# Patient Record
Sex: FEMALE | Race: WHITE | NOT HISPANIC OR LATINO | Employment: FULL TIME | URBAN - METROPOLITAN AREA
[De-identification: names, ages, dates, MRNs, and addresses within clinical notes are randomized per-mention and may not be internally consistent; named-entity substitution may affect disease eponyms.]

---

## 2023-06-02 LAB
THYROTROPIN (MIU/L) IN SER/PLAS BY DETECTION LIMIT <= 0.05 MIU/L: 12.9 MIU/L (ref 0.44–3.98)
THYROXINE (T4) FREE (NG/DL) IN SER/PLAS: 0.92 NG/DL (ref 0.61–1.12)

## 2023-06-03 LAB
ESTIMATED AVERAGE GLUCOSE FOR HBA1C: 321 MG/DL
HEMOGLOBIN A1C/HEMOGLOBIN TOTAL IN BLOOD: 12.8 %

## 2023-06-07 ENCOUNTER — APPOINTMENT (OUTPATIENT)
Dept: PRIMARY CARE | Facility: CLINIC | Age: 53
End: 2023-06-07
Payer: COMMERCIAL

## 2023-06-09 ENCOUNTER — TELEPHONE (OUTPATIENT)
Dept: PRIMARY CARE | Facility: CLINIC | Age: 53
End: 2023-06-09

## 2023-06-09 ENCOUNTER — OFFICE VISIT (OUTPATIENT)
Dept: PRIMARY CARE | Facility: CLINIC | Age: 53
End: 2023-06-09
Payer: COMMERCIAL

## 2023-06-09 VITALS
BODY MASS INDEX: 28.49 KG/M2 | HEIGHT: 63 IN | DIASTOLIC BLOOD PRESSURE: 78 MMHG | RESPIRATION RATE: 17 BRPM | TEMPERATURE: 96 F | OXYGEN SATURATION: 96 % | HEART RATE: 89 BPM | SYSTOLIC BLOOD PRESSURE: 117 MMHG | WEIGHT: 160.8 LBS

## 2023-06-09 DIAGNOSIS — I10 BENIGN ESSENTIAL HYPERTENSION: ICD-10-CM

## 2023-06-09 DIAGNOSIS — Z12.31 BREAST CANCER SCREENING BY MAMMOGRAM: ICD-10-CM

## 2023-06-09 DIAGNOSIS — I25.10 CAD IN NATIVE ARTERY: ICD-10-CM

## 2023-06-09 DIAGNOSIS — Z98.61 S/P PTCA (PERCUTANEOUS TRANSLUMINAL CORONARY ANGIOPLASTY): ICD-10-CM

## 2023-06-09 DIAGNOSIS — E03.9 HYPOTHYROIDISM, UNSPECIFIED TYPE: ICD-10-CM

## 2023-06-09 DIAGNOSIS — E11.65 TYPE 2 DIABETES MELLITUS WITH HYPERGLYCEMIA, WITH LONG-TERM CURRENT USE OF INSULIN (MULTI): ICD-10-CM

## 2023-06-09 DIAGNOSIS — A60.09 GENITAL HERPES IN WOMEN: ICD-10-CM

## 2023-06-09 DIAGNOSIS — E11.59 TYPE 2 DIABETES MELLITUS WITH OTHER CIRCULATORY COMPLICATION, WITHOUT LONG-TERM CURRENT USE OF INSULIN (MULTI): Primary | ICD-10-CM

## 2023-06-09 DIAGNOSIS — Z79.4 TYPE 2 DIABETES MELLITUS WITH HYPERGLYCEMIA, WITH LONG-TERM CURRENT USE OF INSULIN (MULTI): ICD-10-CM

## 2023-06-09 DIAGNOSIS — K62.5 RECTAL BLEEDING: ICD-10-CM

## 2023-06-09 DIAGNOSIS — K59.09 CHRONIC CONSTIPATION: ICD-10-CM

## 2023-06-09 DIAGNOSIS — E78.2 MIXED HYPERLIPIDEMIA: ICD-10-CM

## 2023-06-09 DIAGNOSIS — Z11.59 ENCOUNTER FOR HEPATITIS C SCREENING TEST FOR LOW RISK PATIENT: ICD-10-CM

## 2023-06-09 PROBLEM — I25.110 CORONARY ARTERY DISEASE WITH UNSTABLE ANGINA PECTORIS (MULTI): Status: RESOLVED | Noted: 2023-06-09 | Resolved: 2023-06-09

## 2023-06-09 PROBLEM — K21.9 GERD (GASTROESOPHAGEAL REFLUX DISEASE): Status: ACTIVE | Noted: 2023-06-09

## 2023-06-09 PROBLEM — E78.5 HYPERLIPIDEMIA: Status: ACTIVE | Noted: 2023-06-09

## 2023-06-09 PROBLEM — K81.9 CHOLECYSTITIS: Status: RESOLVED | Noted: 2023-06-09 | Resolved: 2023-06-09

## 2023-06-09 PROBLEM — R19.8 ALTERNATING CONSTIPATION AND DIARRHEA: Status: ACTIVE | Noted: 2023-06-09

## 2023-06-09 PROBLEM — E55.9 VITAMIN D DEFICIENCY: Status: ACTIVE | Noted: 2023-06-09

## 2023-06-09 PROCEDURE — 3074F SYST BP LT 130 MM HG: CPT | Performed by: FAMILY MEDICINE

## 2023-06-09 PROCEDURE — 3078F DIAST BP <80 MM HG: CPT | Performed by: FAMILY MEDICINE

## 2023-06-09 PROCEDURE — 99204 OFFICE O/P NEW MOD 45 MIN: CPT | Performed by: FAMILY MEDICINE

## 2023-06-09 PROCEDURE — 1036F TOBACCO NON-USER: CPT | Performed by: FAMILY MEDICINE

## 2023-06-09 PROCEDURE — 3046F HEMOGLOBIN A1C LEVEL >9.0%: CPT | Performed by: FAMILY MEDICINE

## 2023-06-09 RX ORDER — NITROGLYCERIN 0.4 MG/1
TABLET SUBLINGUAL
COMMUNITY
Start: 2021-07-27

## 2023-06-09 RX ORDER — LEVOTHYROXINE SODIUM 200 UG/1
200 TABLET ORAL DAILY
Qty: 90 TABLET | Refills: 3 | Status: SHIPPED | OUTPATIENT
Start: 2023-06-09 | End: 2024-06-08

## 2023-06-09 RX ORDER — INSULIN LISPRO 100 [IU]/ML
INJECTION, SOLUTION INTRAVENOUS; SUBCUTANEOUS
COMMUNITY
End: 2023-06-09 | Stop reason: SDUPTHER

## 2023-06-09 RX ORDER — VALACYCLOVIR HYDROCHLORIDE 500 MG/1
1 TABLET, FILM COATED ORAL DAILY
COMMUNITY
End: 2023-07-07 | Stop reason: SDUPTHER

## 2023-06-09 RX ORDER — DOCUSATE SODIUM 100 MG/1
CAPSULE, LIQUID FILLED ORAL DAILY PRN
COMMUNITY

## 2023-06-09 RX ORDER — CHOLECALCIFEROL (VITAMIN D3) 125 MCG
CAPSULE ORAL
COMMUNITY
End: 2023-06-09 | Stop reason: DRUGHIGH

## 2023-06-09 RX ORDER — INSULIN GLARGINE 100 [IU]/ML
34 INJECTION, SOLUTION SUBCUTANEOUS NIGHTLY
Qty: 34 ML | Refills: 1 | Status: SHIPPED | OUTPATIENT
Start: 2023-06-09 | End: 2023-07-07 | Stop reason: SDUPTHER

## 2023-06-09 RX ORDER — CHOLECALCIFEROL (VITAMIN D3) 25 MCG
1 TABLET ORAL DAILY
COMMUNITY

## 2023-06-09 RX ORDER — PANTOPRAZOLE SODIUM 40 MG/1
TABLET, DELAYED RELEASE ORAL 2 TIMES DAILY
COMMUNITY
Start: 2021-08-20

## 2023-06-09 RX ORDER — INSULIN GLARGINE 100 [IU]/ML
INJECTION, SOLUTION SUBCUTANEOUS
COMMUNITY
Start: 2019-08-05 | End: 2023-06-09 | Stop reason: SDUPTHER

## 2023-06-09 RX ORDER — POLYETHYLENE GLYCOL 3350 17 G/17G
17 POWDER, FOR SOLUTION ORAL DAILY
Qty: 100 EACH | Refills: 3 | Status: SHIPPED | OUTPATIENT
Start: 2023-06-09 | End: 2023-07-07 | Stop reason: ALTCHOICE

## 2023-06-09 RX ORDER — ATORVASTATIN CALCIUM 80 MG/1
1 TABLET, FILM COATED ORAL NIGHTLY
COMMUNITY
Start: 2021-03-06 | End: 2023-12-05 | Stop reason: SDUPTHER

## 2023-06-09 RX ORDER — INSULIN LISPRO 100 [IU]/ML
25 INJECTION, SOLUTION INTRAVENOUS; SUBCUTANEOUS
Qty: 69 ML | Refills: 1 | Status: SHIPPED | OUTPATIENT
Start: 2023-06-09 | End: 2023-07-07 | Stop reason: ALTCHOICE

## 2023-06-09 RX ORDER — LEVOTHYROXINE SODIUM 50 UG/1
50 TABLET ORAL DAILY
Qty: 90 TABLET | Refills: 3 | Status: SHIPPED | OUTPATIENT
Start: 2023-06-09 | End: 2024-06-08

## 2023-06-09 RX ORDER — ASPIRIN 81 MG/1
1 TABLET ORAL DAILY
COMMUNITY
Start: 2021-03-06

## 2023-06-09 RX ORDER — LEVOTHYROXINE SODIUM 200 UG/1
1 TABLET ORAL DAILY
COMMUNITY
Start: 2021-03-08 | End: 2023-06-09 | Stop reason: DRUGHIGH

## 2023-06-09 RX ORDER — CLOPIDOGREL BISULFATE 75 MG/1
1 TABLET ORAL DAILY
COMMUNITY
Start: 2022-03-14 | End: 2023-12-28 | Stop reason: ALTCHOICE

## 2023-06-09 RX ORDER — ISOSORBIDE MONONITRATE 30 MG/1
1 TABLET, EXTENDED RELEASE ORAL DAILY
COMMUNITY
Start: 2021-08-06

## 2023-06-09 RX ORDER — LEVOTHYROXINE SODIUM 25 UG/1
1 TABLET ORAL DAILY
COMMUNITY
Start: 2021-03-08 | End: 2023-06-09 | Stop reason: DRUGHIGH

## 2023-06-09 RX ORDER — FIBER
1 TABLET ORAL DAILY
COMMUNITY

## 2023-06-09 ASSESSMENT — ENCOUNTER SYMPTOMS
APPETITE CHANGE: 0
FATIGUE: 0
BRUISES/BLEEDS EASILY: 0
DIZZINESS: 0
POLYPHAGIA: 0
COUGH: 0
LOSS OF SENSATION IN FEET: 0
SHORTNESS OF BREATH: 0
DIARRHEA: 0
HEADACHES: 0
CONSTIPATION: 0
NAUSEA: 0
MYALGIAS: 0
TROUBLE SWALLOWING: 0
POLYDIPSIA: 0
PALPITATIONS: 0
UNEXPECTED WEIGHT CHANGE: 0
OCCASIONAL FEELINGS OF UNSTEADINESS: 0
ARTHRALGIAS: 0
DEPRESSION: 0

## 2023-06-09 ASSESSMENT — VISUAL ACUITY
OD_CC: 20/20
OS_CC: 20/20

## 2023-06-09 ASSESSMENT — PATIENT HEALTH QUESTIONNAIRE - PHQ9
2. FEELING DOWN, DEPRESSED OR HOPELESS: NOT AT ALL
1. LITTLE INTEREST OR PLEASURE IN DOING THINGS: NOT AT ALL
SUM OF ALL RESPONSES TO PHQ9 QUESTIONS 1 AND 2: 0

## 2023-06-09 ASSESSMENT — COLUMBIA-SUICIDE SEVERITY RATING SCALE - C-SSRS
6. HAVE YOU EVER DONE ANYTHING, STARTED TO DO ANYTHING, OR PREPARED TO DO ANYTHING TO END YOUR LIFE?: NO
1. IN THE PAST MONTH, HAVE YOU WISHED YOU WERE DEAD OR WISHED YOU COULD GO TO SLEEP AND NOT WAKE UP?: NO
2. HAVE YOU ACTUALLY HAD ANY THOUGHTS OF KILLING YOURSELF?: NO

## 2023-06-09 NOTE — ASSESSMENT & PLAN NOTE
Did not tolerate Metformin. Januvia ineffective. On Insulin and never been in control. Has known CAD. Start with GLP1, taper insulin. Needs SGLT2 for cardioprotective effect but will add GLP1 alone first as do not want to cause hypoglycemia. Goal to get off insulin or minimize and get A1C down less than 7. Needs to check sugar ac and hs. Refer ophthal. Urine alb/creat. Referred [facility], nutrition. Has endo appt but not til December. Discussed need for frequent followup.

## 2023-06-09 NOTE — PATIENT INSTRUCTIONS
Started Rybelsus 3 mg, will go to 7 mg if doing ok next month.   Check sugars before meals and bedtime. Keep log of sugars, insulin intake.   Wean down Humalog if sugar goes down below 200 all the time.     You have referral to [facility].   You have referral to Nutrition.     Mammogram ordered.     Eat 3 meals a day and  snack in the evening. Goal carbs is 45-60 gm max with each meal and 1-2 15 gm carb snacks. No simplue sugars.   Get carb counting radha on phone.     Follow up in 4 weeks.

## 2023-06-09 NOTE — ASSESSMENT & PLAN NOTE
Sounds like hemorrhoid or fissure due to chronic constipation. Will order Miralax to take daily. Reviewed colonoscopy report.

## 2023-06-09 NOTE — PROGRESS NOTES
Subjective   Patient ID: Cleopatra Duncan is a 52 y.o. female who presents for [facility] (Establish new provider).    Here as new patient.     Type 2 Dm for 10 yrs. Has been on Metformin in the past. Did better on Januvia and insurance would not cover it. She has been on Lantus and Humalog and off for a while due to no doctor. She had been on Trulicity but only got one month. She had only been taking Humalog and has been out for a month. Lantus 36 units at night and Humalog sliding scale. She has not been checking sugar. She was doing 18-20 at every meal and not checking sugars. Always around 300. Not sure if had protein in urine. Vision is more blurry. Last eye visit was 1 yr. Dilated. Went to [place] prior. Needs new doctor for that. Seeing endocrinology in December. Does foot care, has intermittent tingling in feet.     Hypothyroid since 29 yrs old. Only missed 3 d of Levothyroxine. Has not been  increased in years. She is on 225 mcg daily. Takes on empty stomach.     Hyperlipidemia - On Atorvastatin every night.     MI 3/2021 with stent - sees Dr. Guillen. Stent in LAD.     GERD - on PPI recently restarted. EGD 5/22/2022. Chronic Gastritis.     Rectal bleeding - Dr. Guillen referred to GI. Has chronic constipation. Benefiber but was not taking enough. She had colonoscopy May 2022. Tubular adenoma times one, hyperplastic polyps, diverticulosis. Is on Gavilax. Blood is only when wipes. Only if strains. Due for repeat in May 2027. She has Bms about 3 times a week. Stool very hard at the beginning. Behind that, her stool is soft. Not using gavilax to full dose.     Menopausal - has itching down on perineum. Tube of anti-itch.     Genital herpes - comes back about 2 times a year. Last time in February.     Not seen gynecologist in years Hyster 2011 due to Fibroids and bleeding. Still with ovaries.                Current Outpatient Medications:     aspirin 81 mg EC tablet, Take 1 tablet (81 mg) by mouth once daily., Disp:  , Rfl:     atorvastatin (Lipitor) 80 mg tablet, Take 1 tablet (80 mg) by mouth once daily at bedtime., Disp: , Rfl:     cholecalciferol (Vitamin D-3) 25 MCG (1000 UT) tablet, Take 1 tablet (25 mcg) by mouth once daily., Disp: , Rfl:     clopidogrel (Plavix) 75 mg tablet, Take 1 tablet (75 mg) by mouth once daily., Disp: , Rfl:     docusate sodium (Colace) 100 mg capsule, Take by mouth once daily as needed., Disp: , Rfl:     hydrocortisone acetate 2.5 % cream with perineal applicator, once daily., Disp: , Rfl:     isosorbide mononitrate ER (Imdur) 30 mg 24 hr tablet, Take 1 tablet (30 mg) by mouth once daily., Disp: , Rfl:     multivitamin-min-iron-FA-vit K (Multi-Day Plus Minerals) 18 mg iron-400 mcg-25 mcg tablet, Take 1 tablet by mouth once daily., Disp: , Rfl:     nitroglycerin (Nitrostat) 0.4 mg SL tablet, Place under the tongue., Disp: , Rfl:     pantoprazole (ProtoNix) 40 mg EC tablet, Take by mouth twice a day., Disp: , Rfl:     valACYclovir (Valtrex) 500 mg tablet, Take 1 tablet (500 mg) by mouth once daily., Disp: , Rfl:     insulin lispro (HumaLOG U-100 Insulin) 100 unit/mL injection, Inject 0.25 mL (25 Units) under the skin 3 times a day with meals. Take as directed per insulin instructions., Disp: 69 mL, Rfl: 1    Lantus Solostar U-100 Insulin 100 unit/mL (3 mL) pen, Inject 34 Units under the skin once daily at bedtime., Disp: 34 mL, Rfl: 1    levothyroxine (Synthroid, Levoxyl) 200 mcg tablet, Take 1 tablet (200 mcg) by mouth once daily., Disp: 90 tablet, Rfl: 3    levothyroxine (Synthroid, Levoxyl) 50 mcg tablet, Take 1 tablet (50 mcg) by mouth once daily., Disp: 90 tablet, Rfl: 3    polyethylene glycol (Glycolax) 17 gram packet, Take 17 g by mouth once daily. Mix 1 cap (17g) into 8 ounces of fluid., Disp: 100 each, Rfl: 3    semaglutide (Rybelsus) 3 mg tablet tablet, Take 1 tablet (3 mg) by mouth once daily., Disp: 30 tablet, Rfl: 1    Patient Active Problem List   Diagnosis    Alternating constipation  "and diarrhea    Benign essential hypertension    CAD in native artery    Genital herpes in women    GERD (gastroesophageal reflux disease)    Hyperlipidemia    Hypothyroidism    Overweight    Rectal bleeding    S/P PTCA (percutaneous transluminal coronary angioplasty)    Vitamin D deficiency    Type 2 diabetes mellitus with hyperglycemia, with long-term current use of insulin (CMS/[facility])    Chronic constipation         Review of Systems   Constitutional:  Negative for appetite change, fatigue and unexpected weight change.   HENT:  Negative for trouble swallowing.    Eyes:  Negative for visual disturbance.   Respiratory:  Negative for cough and shortness of breath.    Cardiovascular:  Negative for chest pain, palpitations and leg swelling.   Gastrointestinal:  Negative for constipation, diarrhea and nausea.   Endocrine: Negative for cold intolerance, heat intolerance, polydipsia, polyphagia and polyuria.   Musculoskeletal:  Negative for arthralgias and myalgias.   Skin:  Negative for rash.   Neurological:  Negative for dizziness and headaches.   Hematological:  Does not bruise/bleed easily.       Objective   /78 (BP Location: Left arm, Patient Position: Sitting, BP Cuff Size: Adult)   Pulse 89   Temp 35.6 °C (96 °F)   Resp 17   Ht 1.6 m (5' 3\")   Wt 72.9 kg (160 lb 12.8 oz)   SpO2 96%   BMI 28.48 kg/m²     Physical Exam  Vitals reviewed.   Constitutional:       General: She is not in acute distress.     Appearance: She is not ill-appearing.   HENT:      Head: Normocephalic.   Neck:      Vascular: No carotid bruit.      Comments: No thyromegaly  Cardiovascular:      Rate and Rhythm: Normal rate and regular rhythm.      Pulses: Normal pulses.      Heart sounds: No murmur heard.  Pulmonary:      Effort: Pulmonary effort is normal.      Breath sounds: Normal breath sounds.   Musculoskeletal:      Right lower leg: No edema.      Left lower leg: No edema.   Skin:     Findings: No rash.   Neurological:      " Mental Status: She is alert.   Psychiatric:         Mood and Affect: Mood normal.         Assessment/Plan   Problem List Items Addressed This Visit          Circulatory    Benign essential hypertension     BP at goal. Discussed role of BP control in DM in reduction of renovascular and cardiovascular risk. Labs, follow up 4 weeks. Referred to [facility] clinic.          Relevant Orders    Referral to [facility]    Referral to Nutrition Services    CAD in native artery     Has stent in place. Discussed mportance of glucose control, advantages of diabetes medication choice . Discusse dimportance of diabetes and exercise. She needs local cardiology follow up. She is interested in the [facility] clinin. Referred. Continued current medication pending labs and further evaluation.          Relevant Medications    clopidogrel (Plavix) 75 mg tablet    isosorbide mononitrate ER (Imdur) 30 mg 24 hr tablet    nitroglycerin (Nitrostat) 0.4 mg SL tablet    semaglutide (Rybelsus) 3 mg tablet tablet    Other Relevant Orders    Referral to [facility]       Digestive    Rectal bleeding     Sounds like hemorrhoid or fissure due to chronic constipation. Will order Miralax to take daily. Reviewed colonoscopy report.          Chronic constipation     She has not been using Miralax at proper dose. Using child's Miralax. Given rx for 17 gm daily. Discussed need to tirtrate to 1 stool daily. Encuraged to avoid oral laxative pills. Stay well hydrated, increase actitivy.          Relevant Medications    polyethylene glycol (Glycolax) 17 gram packet       Genitourinary    Genital herpes in women     Recurs if does not take Valacyclovir prophylaxis. Continue daily medication            Endocrine/Metabolic    Hypothyroidism     TSH 12.90 on 225 MCG. Increase to 200 and 50 mg pills. Recheck labs in 6-8 weeks.          Relevant Medications    levothyroxine (Synthroid, Levoxyl) 50 mcg tablet    levothyroxine (Synthroid, Levoxyl) 200 mcg tablet    Type  2 diabetes mellitus with hyperglycemia, with long-term current use of insulin (CMS/[facility]) - Primary     Did not tolerate Metformin. Januvia ineffective. On Insulin and never been in control. Has known CAD. Start with GLP1, taper insulin. Needs SGLT2 for cardioprotective effect but will add GLP1 alone first as do not want to cause hypoglycemia. Goal to get off insulin or minimize and get A1C down less than 7. Needs to check sugar ac and hs. Refer ophthal. Urine alb/creat. Referred [facility] nutrition. Has endo appt but not til December. Discussed need for frequent followup.          Relevant Medications    semaglutide (Rybelsus) 3 mg tablet tablet    Lantus Solostar U-100 Insulin 100 unit/mL (3 mL) pen    insulin lispro (HumaLOG U-100 Insulin) 100 unit/mL injection       Other    Hyperlipidemia     Statin was ordered by cardiology and labs done this week at goal. Continue statin. Would like to start her on ACEI or ARB or CCB but need to check with cardiology first. Currently, cardiology in [place] and will need local cardiology.          Relevant Orders    Referral to [facility]    Referral to Nutrition Services    S/P PTCA (percutaneous transluminal coronary angioplasty)     On Clopidogrel, statin, isosorbide. Has seen cardioliogy recently but will need to establish locally.           Other Visit Diagnoses       Breast cancer screening by mammogram        Relevant Orders    BI mammo bilateral screening tomosynthesis    Encounter for hepatitis C screening test for low risk patient        Relevant Orders    Hepatitis C Antibody              Assessment, plans, tests, and follow up discussed with patient and patient verbalized understanding. Cleopatra was given an opportunity to ask questions and  any concerns were addressed including but not limited to medications, goals of therapy, medi changes, referrals, and follow up..

## 2023-06-09 NOTE — TELEPHONE ENCOUNTER
Rx Refill Request Telephone Encounter  These were not sent over and patient is completely out.  Name:  Cleopatra Duncan  :  [id number]  Medication Name:    insulin lispro (HumaLOG) 100 unit/mL injection      Lantus Solostar U-100 Insulin 100 unit/mL (3 mL) pen         Specific Pharmacy location: [facility] - [place] [address]    Date of last appointment: 23   Date of next appointment:  23  Best number to reach patient: [phone]

## 2023-06-09 NOTE — ASSESSMENT & PLAN NOTE
Statin was ordered by cardiology and labs done this week at goal. Continue statin. Would like to start her on ACEI or ARB or CCB but need to check with cardiology first. Currently, cardiology in [place] and will need local cardiology.

## 2023-06-10 PROBLEM — K59.09 CHRONIC CONSTIPATION: Status: ACTIVE | Noted: 2023-06-10

## 2023-06-11 NOTE — ASSESSMENT & PLAN NOTE
Has stent in place. Discussed mportance of glucose control, advantages of diabetes medication choice . Discusse dimportance of diabetes and exercise. She needs local cardiology follow up. She is interested in the [facility] clinin. Referred. Continued current medication pending labs and further evaluation.

## 2023-06-11 NOTE — ASSESSMENT & PLAN NOTE
On Clopidogrel, statin, isosorbide. Has seen cardioliogy recently but will need to establish locally.

## 2023-06-11 NOTE — ASSESSMENT & PLAN NOTE
She has not been using Miralax at proper dose. Using child's Miralax. Given rx for 17 gm daily. Discussed need to tirtrate to 1 stool daily. Encuraged to avoid oral laxative pills. Stay well hydrated, increase actitivy.

## 2023-06-11 NOTE — ASSESSMENT & PLAN NOTE
BP at goal. Discussed role of BP control in DM in reduction of renovascular and cardiovascular risk. Labs, follow up 4 weeks. Referred to [facility].

## 2023-06-16 ENCOUNTER — LAB (OUTPATIENT)
Dept: LAB | Facility: LAB | Age: 53
End: 2023-06-16
Payer: COMMERCIAL

## 2023-06-16 DIAGNOSIS — Z11.59 ENCOUNTER FOR HEPATITIS C SCREENING TEST FOR LOW RISK PATIENT: ICD-10-CM

## 2023-06-16 DIAGNOSIS — E11.59 TYPE 2 DIABETES MELLITUS WITH OTHER CIRCULATORY COMPLICATION, WITHOUT LONG-TERM CURRENT USE OF INSULIN (MULTI): ICD-10-CM

## 2023-06-16 LAB
ALBUMIN (MG/L) IN URINE: 10.9 MG/L
ALBUMIN/CREATININE (UG/MG) IN URINE: 7.8 UG/MG CRT (ref 0–30)
CREATININE (MG/DL) IN URINE: 139 MG/DL (ref 20–320)
HEPATITIS C VIRUS AB PRESENCE IN SERUM: NONREACTIVE

## 2023-06-16 PROCEDURE — 82043 UR ALBUMIN QUANTITATIVE: CPT

## 2023-06-16 PROCEDURE — 82570 ASSAY OF URINE CREATININE: CPT

## 2023-06-16 PROCEDURE — 36415 COLL VENOUS BLD VENIPUNCTURE: CPT

## 2023-06-16 PROCEDURE — 86803 HEPATITIS C AB TEST: CPT

## 2023-07-07 ENCOUNTER — OFFICE VISIT (OUTPATIENT)
Dept: PRIMARY CARE | Facility: CLINIC | Age: 53
End: 2023-07-07
Payer: COMMERCIAL

## 2023-07-07 VITALS
WEIGHT: 161.2 LBS | SYSTOLIC BLOOD PRESSURE: 106 MMHG | DIASTOLIC BLOOD PRESSURE: 74 MMHG | OXYGEN SATURATION: 97 % | HEART RATE: 84 BPM | TEMPERATURE: 97 F | HEIGHT: 63 IN | BODY MASS INDEX: 28.56 KG/M2

## 2023-07-07 DIAGNOSIS — Z79.4 TYPE 2 DIABETES MELLITUS WITH HYPERGLYCEMIA, WITH LONG-TERM CURRENT USE OF INSULIN (MULTI): Primary | ICD-10-CM

## 2023-07-07 DIAGNOSIS — E03.9 HYPOTHYROIDISM, UNSPECIFIED TYPE: ICD-10-CM

## 2023-07-07 DIAGNOSIS — E11.59 TYPE 2 DIABETES MELLITUS WITH OTHER CIRCULATORY COMPLICATION, WITHOUT LONG-TERM CURRENT USE OF INSULIN (MULTI): ICD-10-CM

## 2023-07-07 DIAGNOSIS — K62.5 RECTAL BLEEDING: ICD-10-CM

## 2023-07-07 DIAGNOSIS — E55.9 VITAMIN D DEFICIENCY: ICD-10-CM

## 2023-07-07 DIAGNOSIS — E78.2 MIXED HYPERLIPIDEMIA: ICD-10-CM

## 2023-07-07 DIAGNOSIS — A60.09 GENITAL HERPES IN WOMEN: ICD-10-CM

## 2023-07-07 DIAGNOSIS — Z23 NEED FOR VACCINATION: ICD-10-CM

## 2023-07-07 DIAGNOSIS — E11.65 TYPE 2 DIABETES MELLITUS WITH HYPERGLYCEMIA, WITH LONG-TERM CURRENT USE OF INSULIN (MULTI): Primary | ICD-10-CM

## 2023-07-07 PROBLEM — N60.09 CYST OF BREAST: Status: RESOLVED | Noted: 2023-07-07 | Resolved: 2023-07-07

## 2023-07-07 PROBLEM — R42 VERTIGO: Status: RESOLVED | Noted: 2023-07-07 | Resolved: 2023-07-07

## 2023-07-07 PROCEDURE — 3046F HEMOGLOBIN A1C LEVEL >9.0%: CPT | Performed by: FAMILY MEDICINE

## 2023-07-07 PROCEDURE — 90471 IMMUNIZATION ADMIN: CPT | Performed by: FAMILY MEDICINE

## 2023-07-07 PROCEDURE — 3074F SYST BP LT 130 MM HG: CPT | Performed by: FAMILY MEDICINE

## 2023-07-07 PROCEDURE — 99214 OFFICE O/P EST MOD 30 MIN: CPT | Performed by: FAMILY MEDICINE

## 2023-07-07 PROCEDURE — 1036F TOBACCO NON-USER: CPT | Performed by: FAMILY MEDICINE

## 2023-07-07 PROCEDURE — 3078F DIAST BP <80 MM HG: CPT | Performed by: FAMILY MEDICINE

## 2023-07-07 PROCEDURE — 90715 TDAP VACCINE 7 YRS/> IM: CPT | Performed by: FAMILY MEDICINE

## 2023-07-07 RX ORDER — VALACYCLOVIR HYDROCHLORIDE 500 MG/1
500 TABLET, FILM COATED ORAL DAILY
Qty: 30 TABLET | Refills: 2 | Status: SHIPPED | OUTPATIENT
Start: 2023-07-07 | End: 2023-10-05

## 2023-07-07 RX ORDER — INSULIN LISPRO 100 [IU]/ML
20 INJECTION, SOLUTION INTRAVENOUS; SUBCUTANEOUS
COMMUNITY
Start: 2023-06-09 | End: 2023-07-07 | Stop reason: SDUPTHER

## 2023-07-07 RX ORDER — INSULIN LISPRO 100 [IU]/ML
20 INJECTION, SOLUTION INTRAVENOUS; SUBCUTANEOUS
Qty: 60 ML | Refills: 1 | Status: SHIPPED | OUTPATIENT
Start: 2023-07-07 | End: 2023-09-08 | Stop reason: SDUPTHER

## 2023-07-07 RX ORDER — INSULIN GLARGINE 100 [IU]/ML
40 INJECTION, SOLUTION SUBCUTANEOUS NIGHTLY
Qty: 36 ML | Refills: 1 | Status: SHIPPED | OUTPATIENT
Start: 2023-07-07 | End: 2023-12-29 | Stop reason: ALTCHOICE

## 2023-07-07 ASSESSMENT — ENCOUNTER SYMPTOMS
POLYDIPSIA: 0
HEADACHES: 0
PALPITATIONS: 0
POLYPHAGIA: 0
FATIGUE: 0
DIZZINESS: 0
APPETITE CHANGE: 0
TROUBLE SWALLOWING: 0
UNEXPECTED WEIGHT CHANGE: 0
OCCASIONAL FEELINGS OF UNSTEADINESS: 0
SHORTNESS OF BREATH: 0
MYALGIAS: 0
NAUSEA: 0
DIARRHEA: 0
LOSS OF SENSATION IN FEET: 0
DEPRESSION: 0

## 2023-07-07 NOTE — PROGRESS NOTES
Subjective   Patient ID: Cleopatra Duncan is a 52 y.o. female who presents for Follow-up (4 week follow up medication.  She has not started it yet due to insurance not approving.  It is still pending status. ).    Here to follow up from New patient visit.     She was referred to [facility] , visit scheduled next month.     DM2 - she is cutting out sweets, or substitute sugar free candy. Limits breads, got thin bread. Switched pop to sugar free. Only every other day. She did not get Rybelsus. Was told prior authorization was holding it up. She is going to see endocrinology at [facility].   Brought sugar logs. Fastings 240-203. Lunch 168-217, Dinner 177- 321. HS usu 290 - 305, once 198. She does not take short acting insulin for high sugars at bedtime or if skips a meal. Currently takes 9-15 units with meal even if sugars high.     Hypothyroid - increased dose of Synthroid to 250 mcg last time. Due for repeat lab in 1-2 months. She does not feel any different.     Heart disease - on Imdur, Clpidogrel, Atorvastatin.  Sees Dr. Guillen in [place]. Labs were [place] [facility]. Had PTCA with angioplasty.     Needs refill for valtrex.     Constipation is better.     Perineal itching went away with yeast cream.     Leaks urine when coughs. She goes to bathroom frequently to stay empty. She also gets urgency if does not go frequently enough.                Current Outpatient Medications:     aspirin 81 mg EC tablet, Take 1 tablet (81 mg) by mouth once daily., Disp: , Rfl:     atorvastatin (Lipitor) 80 mg tablet, Take 1 tablet (80 mg) by mouth once daily at bedtime., Disp: , Rfl:     cholecalciferol (Vitamin D-3) 25 MCG (1000 UT) tablet, Take 1 tablet (25 mcg) by mouth once daily., Disp: , Rfl:     clopidogrel (Plavix) 75 mg tablet, Take 1 tablet (75 mg) by mouth once daily., Disp: , Rfl:     docusate sodium (Colace) 100 mg capsule, Take by mouth once daily as needed., Disp: , Rfl:     hydrocortisone acetate 2.5 % cream with perineal applicator, once  daily., Disp: , Rfl:     isosorbide mononitrate ER (Imdur) 30 mg 24 hr tablet, Take 1 tablet (30 mg) by mouth once daily., Disp: , Rfl:     levothyroxine (Synthroid, Levoxyl) 200 mcg tablet, Take 1 tablet (200 mcg) by mouth once daily., Disp: 90 tablet, Rfl: 3    levothyroxine (Synthroid, Levoxyl) 50 mcg tablet, Take 1 tablet (50 mcg) by mouth once daily., Disp: 90 tablet, Rfl: 3    multivitamin-min-iron-FA-vit K (Multi-Day Plus Minerals) 18 mg iron-400 mcg-25 mcg tablet, Take 1 tablet by mouth once daily., Disp: , Rfl:     nitroglycerin (Nitrostat) 0.4 mg SL tablet, Place under the tongue., Disp: , Rfl:     pantoprazole (ProtoNix) 40 mg EC tablet, Take by mouth twice a day., Disp: , Rfl:     HumaLOG KwikPen Insulin 100 unit/mL injection, Inject 20 Units under the skin 3 times a day with meals. Adjust per sliding scale, No more than 100 units daily, Disp: 60 mL, Rfl: 1    Lantus Solostar U-100 Insulin 100 unit/mL (3 mL) pen, Inject 40 Units under the skin once daily at bedtime., Disp: 36 mL, Rfl: 1    semaglutide (Rybelsus) 3 mg tablet tablet, Take 1 tablet (3 mg) by mouth once daily. (Patient not taking: Reported on 7/7/2023), Disp: 30 tablet, Rfl: 1    valACYclovir (Valtrex) 500 mg tablet, Take 1 tablet (500 mg) by mouth once daily., Disp: 30 tablet, Rfl: 2    Patient Active Problem List   Diagnosis    Alternating constipation and diarrhea    CAD in native artery    Genital herpes in women    GERD (gastroesophageal reflux disease)    Hyperlipidemia    Hypothyroidism    Overweight    Rectal bleeding    S/P PTCA (percutaneous transluminal coronary angioplasty)    Vitamin D deficiency    Type 2 diabetes mellitus with hyperglycemia, with long-term current use of insulin (CMS/[facility])    Chronic constipation         Review of Systems   Constitutional:  Negative for appetite change, fatigue and unexpected weight change.   HENT:  Negative for trouble swallowing.    Respiratory:  Negative for shortness of breath.   "  Cardiovascular:  Negative for chest pain, palpitations and leg swelling.   Gastrointestinal:  Negative for diarrhea and nausea.   Endocrine: Positive for polyuria. Negative for cold intolerance, heat intolerance, polydipsia and polyphagia.   Genitourinary:  Positive for urgency.   Musculoskeletal:  Negative for myalgias.   Skin:  Negative for rash.   Neurological:  Negative for dizziness and headaches.       Objective   /74 (BP Location: Left arm, Patient Position: Sitting)   Pulse 84   Temp 36.1 °C (97 °F)   Ht 1.6 m (5' 3\")   Wt 73.1 kg (161 lb 3.2 oz)   SpO2 97%   BMI 28.56 kg/m²     Physical Exam  Vitals reviewed.   Constitutional:       Appearance: Normal appearance.   Pulmonary:      Effort: Pulmonary effort is normal.   Neurological:      Mental Status: She is alert.   Psychiatric:         Mood and Affect: Mood normal.         Behavior: Behavior normal.         Recent Results (from the past 672 hour(s))   Hepatitis C Antibody    Collection Time: 06/16/23  4:04 PM   Result Value Ref Range    Hepatitis C Ab NONREACTIVE NONREACTIVE   Albumin , Urine Random    Collection Time: 06/16/23  4:04 PM   Result Value Ref Range    ALBUMIN (MG/L) IN URINE 10.9 Not Established mg/L    Albumin/Creatine Ratio 7.8 0.0 - 30.0 ug/mg crt    Creatinine, Urine 139.0 20.0 - 320.0 mg/dL         Assessment/Plan   Problem List Items Addressed This Visit       Genital herpes in women     Valtrex refilled for recurrence.          Relevant Medications    valACYclovir (Valtrex) 500 mg tablet    Hyperlipidemia     On statin, repeat lab in September.          Relevant Orders    Comprehensive Metabolic Panel    Lipid Panel    Hypothyroidism     Dose was increased couple weeks ago to 250 mcg daily. Repeat labs in September.          Relevant Orders    Thyroxine, Free    Thyroid Stimulating Hormone    Rectal bleeding     Resolved with constipation resolving.          Vitamin D deficiency    Type 2 diabetes mellitus with " hyperglycemia, with long-term current use of insulin (CMS/[facility]) - Primary     Increase Lantus to 40 units.     Sliding scale adjusted.  Continue each meal 6 units with the sliding scale.        Use sliding scale even when not eating.          150-200  add 6 units       201-250  add 8 untis       251-300  add 10 units       301-350  add 12 units.        351-400  add 14 units.    Need to check on Rybelsus prior auth.          Relevant Medications    Lantus Solostar U-100 Insulin 100 unit/mL (3 mL) pen    HumaLOG KwikPen Insulin 100 unit/mL injection    Other Relevant Orders    Comprehensive Metabolic Panel    Hemoglobin A1C    Lipid Panel     Other Visit Diagnoses       Type 2 diabetes mellitus with other circulatory complication, without long-term current use of insulin (CMS/[facility])        Relevant Medications    Lantus Solostar U-100 Insulin 100 unit/mL (3 mL) pen    Need for vaccination        Relevant Orders    Tdap vaccine, age 10 years and older (BOOSTRIX) (Completed)              Assessment, plans, tests, and follow up discussed with patient and patient verbalized understanding. Cleopatra was given an opportunity to ask questions and  any concerns were addressed including but not limited to medications, goals of therpay, lab results and follow up.

## 2023-07-07 NOTE — ASSESSMENT & PLAN NOTE
Increase Lantus to 40 units.     Sliding scale adjusted.  Continue each meal 6 units with the sliding scale.        Use sliding scale even when not eating.          150-200  add 6 units       201-250  add 8 untis       251-300  add 10 units       301-350  add 12 units.        351-400  add 14 units.    Need to check on Rybelsus prior auth.

## 2023-07-07 NOTE — PATIENT INSTRUCTIONS
Increase Lantus to 40 units.     Sliding scale adjusted.  Continue each meal 6 units with the sliding scale.        Use sliding scale even when not eating.          150-200  add 6 units       201-250  add 8 untis       251-300  add 10 units       301-350  add 12 units.        351-400  add 14 units.     Keep appontment at [facility].     I will check on the [facility] prior authorization.     Follow up in 3 months  at [facility] with labs prior to visit. Will discuss who is doing what after you are seen at [facility] clinic.     Continue higher dose of Thyroid.

## 2023-08-12 LAB
ALANINE AMINOTRANSFERASE (SGPT) (U/L) IN SER/PLAS: 21 U/L (ref 7–45)
ALBUMIN (G/DL) IN SER/PLAS: 4.1 G/DL (ref 3.4–5)
ALKALINE PHOSPHATASE (U/L) IN SER/PLAS: 146 U/L (ref 33–110)
ANION GAP IN SER/PLAS: 14 MMOL/L (ref 10–20)
ASPARTATE AMINOTRANSFERASE (SGOT) (U/L) IN SER/PLAS: 16 U/L (ref 9–39)
BILIRUBIN TOTAL (MG/DL) IN SER/PLAS: 0.5 MG/DL (ref 0–1.2)
CALCIUM (MG/DL) IN SER/PLAS: 9.4 MG/DL (ref 8.6–10.3)
CARBON DIOXIDE, TOTAL (MMOL/L) IN SER/PLAS: 26 MMOL/L (ref 21–32)
CHLORIDE (MMOL/L) IN SER/PLAS: 103 MMOL/L (ref 98–107)
CHOLESTEROL (MG/DL) IN SER/PLAS: 136 MG/DL (ref 0–199)
CHOLESTEROL IN HDL (MG/DL) IN SER/PLAS: 37.7 MG/DL
CHOLESTEROL/HDL RATIO: 3.6
CREATININE (MG/DL) IN SER/PLAS: 0.69 MG/DL (ref 0.5–1.05)
GFR FEMALE: >90 ML/MIN/1.73M2
GLUCOSE (MG/DL) IN SER/PLAS: 267 MG/DL (ref 74–99)
LDL: 68 MG/DL (ref 0–99)
POTASSIUM (MMOL/L) IN SER/PLAS: 4.8 MMOL/L (ref 3.5–5.3)
PROTEIN TOTAL: 7.1 G/DL (ref 6.4–8.2)
SODIUM (MMOL/L) IN SER/PLAS: 138 MMOL/L (ref 136–145)
TRIGLYCERIDE (MG/DL) IN SER/PLAS: 153 MG/DL (ref 0–149)
UREA NITROGEN (MG/DL) IN SER/PLAS: 19 MG/DL (ref 6–23)
VLDL: 31 MG/DL (ref 0–40)

## 2023-08-26 LAB
C PEPTIDE (NG/ML) IN SER/PLAS: 3.1 NG/ML (ref 0.7–3.9)
C REACTIVE PROTEIN (MG/L) IN SER/PLAS BY HIGH SENSIT: 6.6 MG/L

## 2023-08-30 LAB — LIPOPROTEIN A: 7 MG/DL

## 2023-09-08 DIAGNOSIS — Z79.4 TYPE 2 DIABETES MELLITUS WITH HYPERGLYCEMIA, WITH LONG-TERM CURRENT USE OF INSULIN (MULTI): ICD-10-CM

## 2023-09-08 DIAGNOSIS — E11.65 TYPE 2 DIABETES MELLITUS WITH HYPERGLYCEMIA, WITH LONG-TERM CURRENT USE OF INSULIN (MULTI): ICD-10-CM

## 2023-09-08 NOTE — TELEPHONE ENCOUNTER
Rx Refill Request Telephone Encounter    Name:  Cleopatra Duncan  :  [id number]  Medication Name:      HumaLOG KwikPen Insulin 100 unit/mL injection [[id number]]  inject 20 units under skin 3 times a day with meals.      Patient stated that the pharmacy said this prescription was cancelled on our end.     Patient is totally put.                      Specific Pharmacy location:  [facility]    Date of last appointment:  2023  Date of next appointment:  2023  Best number to reach patient:  [phone]

## 2023-09-22 ENCOUNTER — APPOINTMENT (OUTPATIENT)
Dept: PRIMARY CARE | Facility: CLINIC | Age: 53
End: 2023-09-22
Payer: COMMERCIAL

## 2023-09-27 RX ORDER — INSULIN LISPRO 100 [IU]/ML
20 INJECTION, SOLUTION INTRAVENOUS; SUBCUTANEOUS
Qty: 60 ML | Refills: 1 | Status: SHIPPED | OUTPATIENT
Start: 2023-09-27 | End: 2023-09-27

## 2023-10-01 DIAGNOSIS — E11.65 TYPE 2 DIABETES MELLITUS WITH HYPERGLYCEMIA, WITH LONG-TERM CURRENT USE OF INSULIN (MULTI): Primary | ICD-10-CM

## 2023-10-01 DIAGNOSIS — Z79.4 TYPE 2 DIABETES MELLITUS WITH HYPERGLYCEMIA, WITH LONG-TERM CURRENT USE OF INSULIN (MULTI): Primary | ICD-10-CM

## 2023-10-04 ENCOUNTER — PHARMACY VISIT (OUTPATIENT)
Dept: PHARMACY | Facility: CLINIC | Age: 53
End: 2023-10-04
Payer: COMMERCIAL

## 2023-10-04 RX ORDER — INSULIN LISPRO 100 [IU]/ML
INJECTION, SOLUTION INTRAVENOUS; SUBCUTANEOUS
Qty: 420 ML | Refills: 0 | Status: SHIPPED | OUTPATIENT
Start: 2023-10-04 | End: 2023-12-28 | Stop reason: SDUPTHER

## 2023-10-14 ENCOUNTER — PHARMACY VISIT (OUTPATIENT)
Dept: PHARMACY | Facility: CLINIC | Age: 53
End: 2023-10-14
Payer: COMMERCIAL

## 2023-10-14 PROCEDURE — RXMED WILLOW AMBULATORY MEDICATION CHARGE

## 2023-10-16 ENCOUNTER — PHARMACY VISIT (OUTPATIENT)
Dept: PHARMACY | Facility: CLINIC | Age: 53
End: 2023-10-16
Payer: COMMERCIAL

## 2023-10-16 PROCEDURE — RXMED WILLOW AMBULATORY MEDICATION CHARGE

## 2023-10-25 ENCOUNTER — PHARMACY VISIT (OUTPATIENT)
Dept: PHARMACY | Facility: CLINIC | Age: 53
End: 2023-10-25
Payer: COMMERCIAL

## 2023-10-25 PROCEDURE — RXMED WILLOW AMBULATORY MEDICATION CHARGE

## 2023-11-14 DIAGNOSIS — E11.65 TYPE 2 DIABETES MELLITUS WITH HYPERGLYCEMIA, WITH LONG-TERM CURRENT USE OF INSULIN (MULTI): Primary | ICD-10-CM

## 2023-11-14 DIAGNOSIS — Z79.4 TYPE 2 DIABETES MELLITUS WITH HYPERGLYCEMIA, WITH LONG-TERM CURRENT USE OF INSULIN (MULTI): Primary | ICD-10-CM

## 2023-11-18 ENCOUNTER — PHARMACY VISIT (OUTPATIENT)
Dept: PHARMACY | Facility: CLINIC | Age: 53
End: 2023-11-18

## 2023-11-26 ENCOUNTER — PHARMACY VISIT (OUTPATIENT)
Dept: PHARMACY | Facility: CLINIC | Age: 53
End: 2023-11-26
Payer: COMMERCIAL

## 2023-11-26 PROCEDURE — RXMED WILLOW AMBULATORY MEDICATION CHARGE

## 2023-12-06 ENCOUNTER — APPOINTMENT (OUTPATIENT)
Dept: ENDOCRINOLOGY | Facility: CLINIC | Age: 53
End: 2023-12-06
Payer: COMMERCIAL

## 2023-12-06 ENCOUNTER — APPOINTMENT (OUTPATIENT)
Dept: CARDIOLOGY | Facility: CLINIC | Age: 53
End: 2023-12-06
Payer: COMMERCIAL

## 2023-12-22 DIAGNOSIS — E11.65 TYPE 2 DIABETES MELLITUS WITH HYPERGLYCEMIA, WITH LONG-TERM CURRENT USE OF INSULIN (MULTI): Primary | ICD-10-CM

## 2023-12-22 DIAGNOSIS — Z79.4 TYPE 2 DIABETES MELLITUS WITH HYPERGLYCEMIA, WITH LONG-TERM CURRENT USE OF INSULIN (MULTI): Primary | ICD-10-CM

## 2023-12-22 PROCEDURE — RXMED WILLOW AMBULATORY MEDICATION CHARGE

## 2023-12-24 RX ORDER — TIRZEPATIDE 2.5 MG/.5ML
INJECTION, SOLUTION SUBCUTANEOUS
Qty: 2 ML | Refills: 0 | Status: SHIPPED | OUTPATIENT
Start: 2023-12-24 | End: 2024-01-19 | Stop reason: ALTCHOICE

## 2023-12-24 RX ORDER — TIRZEPATIDE 2.5 MG/.5ML
INJECTION, SOLUTION SUBCUTANEOUS
Qty: 2 ML | Refills: 1 | Status: SHIPPED | OUTPATIENT
Start: 2023-12-24 | End: 2023-12-27 | Stop reason: ALTCHOICE

## 2023-12-26 ENCOUNTER — PHARMACY VISIT (OUTPATIENT)
Dept: PHARMACY | Facility: CLINIC | Age: 53
End: 2023-12-26
Payer: COMMERCIAL

## 2023-12-26 ENCOUNTER — LAB (OUTPATIENT)
Dept: LAB | Facility: LAB | Age: 53
End: 2023-12-26
Payer: COMMERCIAL

## 2023-12-26 DIAGNOSIS — Z79.4 TYPE 2 DIABETES MELLITUS WITH HYPERGLYCEMIA, WITH LONG-TERM CURRENT USE OF INSULIN (MULTI): ICD-10-CM

## 2023-12-26 DIAGNOSIS — E03.9 HYPOTHYROIDISM, UNSPECIFIED TYPE: ICD-10-CM

## 2023-12-26 DIAGNOSIS — E78.2 MIXED HYPERLIPIDEMIA: ICD-10-CM

## 2023-12-26 DIAGNOSIS — E11.65 TYPE 2 DIABETES MELLITUS WITH HYPERGLYCEMIA, WITH LONG-TERM CURRENT USE OF INSULIN (MULTI): ICD-10-CM

## 2023-12-26 LAB
ALBUMIN SERPL BCP-MCNC: 3.8 G/DL (ref 3.4–5)
ALP SERPL-CCNC: 118 U/L (ref 33–110)
ALT SERPL W P-5'-P-CCNC: 23 U/L (ref 7–45)
ANION GAP SERPL CALC-SCNC: 14 MMOL/L (ref 10–20)
AST SERPL W P-5'-P-CCNC: 19 U/L (ref 9–39)
BILIRUB SERPL-MCNC: 0.4 MG/DL (ref 0–1.2)
BUN SERPL-MCNC: 15 MG/DL (ref 6–23)
CALCIUM SERPL-MCNC: 8.9 MG/DL (ref 8.6–10.3)
CHLORIDE SERPL-SCNC: 102 MMOL/L (ref 98–107)
CHOLEST SERPL-MCNC: 195 MG/DL (ref 0–199)
CHOLESTEROL/HDL RATIO: 4.6
CO2 SERPL-SCNC: 26 MMOL/L (ref 21–32)
CREAT SERPL-MCNC: 0.8 MG/DL (ref 0.5–1.05)
CREAT UR-MCNC: 68.5 MG/DL (ref 20–320)
EST. AVERAGE GLUCOSE BLD GHB EST-MCNC: 203 MG/DL
GFR SERPL CREATININE-BSD FRML MDRD: 88 ML/MIN/1.73M*2
GLUCOSE SERPL-MCNC: 252 MG/DL (ref 74–99)
HBA1C MFR BLD: 8.7 %
HDLC SERPL-MCNC: 42.2 MG/DL
LDLC SERPL CALC-MCNC: 100 MG/DL
MICROALBUMIN UR-MCNC: <7 MG/L
MICROALBUMIN/CREAT UR: NORMAL MG/G{CREAT}
NON HDL CHOLESTEROL: 153 MG/DL (ref 0–149)
POTASSIUM SERPL-SCNC: 4.1 MMOL/L (ref 3.5–5.3)
PROT SERPL-MCNC: 6.5 G/DL (ref 6.4–8.2)
SODIUM SERPL-SCNC: 138 MMOL/L (ref 136–145)
T4 FREE SERPL-MCNC: 0.57 NG/DL (ref 0.61–1.12)
TRIGL SERPL-MCNC: 263 MG/DL (ref 0–149)
TSH SERPL-ACNC: 25.24 MIU/L (ref 0.44–3.98)
VLDL: 53 MG/DL (ref 0–40)

## 2023-12-26 PROCEDURE — 82043 UR ALBUMIN QUANTITATIVE: CPT

## 2023-12-26 PROCEDURE — 84439 ASSAY OF FREE THYROXINE: CPT

## 2023-12-26 PROCEDURE — 36415 COLL VENOUS BLD VENIPUNCTURE: CPT

## 2023-12-26 PROCEDURE — 82570 ASSAY OF URINE CREATININE: CPT

## 2023-12-26 PROCEDURE — 84443 ASSAY THYROID STIM HORMONE: CPT

## 2023-12-26 PROCEDURE — 83036 HEMOGLOBIN GLYCOSYLATED A1C: CPT

## 2023-12-26 PROCEDURE — 80053 COMPREHEN METABOLIC PANEL: CPT

## 2023-12-26 PROCEDURE — 80061 LIPID PANEL: CPT

## 2023-12-27 RX ORDER — VALACYCLOVIR HYDROCHLORIDE 500 MG/1
TABLET, FILM COATED ORAL
COMMUNITY
Start: 2023-12-22

## 2023-12-27 ASSESSMENT — ENCOUNTER SYMPTOMS
SEIZURES: 0
TROUBLE SWALLOWING: 0
CONFUSION: 0
DIARRHEA: 0
ACTIVITY CHANGE: 0
VOMITING: 0
POLYDIPSIA: 0
FATIGUE: 0
SHORTNESS OF BREATH: 0
PALPITATIONS: 0
HEADACHES: 0
DIFFICULTY URINATING: 0
CONSTIPATION: 0
POLYPHAGIA: 0
DYSPHORIC MOOD: 0
ABDOMINAL PAIN: 0
WHEEZING: 0
NAUSEA: 0
BLOOD IN STOOL: 0
UNEXPECTED WEIGHT CHANGE: 0
COUGH: 0
NERVOUS/ANXIOUS: 0
ARTHRALGIAS: 0
JOINT SWELLING: 0
APPETITE CHANGE: 0

## 2023-12-28 ENCOUNTER — OFFICE VISIT (OUTPATIENT)
Dept: PRIMARY CARE | Facility: CLINIC | Age: 53
End: 2023-12-28
Payer: COMMERCIAL

## 2023-12-28 VITALS
OXYGEN SATURATION: 98 % | TEMPERATURE: 97.7 F | BODY MASS INDEX: 26.4 KG/M2 | HEART RATE: 90 BPM | SYSTOLIC BLOOD PRESSURE: 111 MMHG | WEIGHT: 157.4 LBS | DIASTOLIC BLOOD PRESSURE: 76 MMHG

## 2023-12-28 DIAGNOSIS — I25.10 CAD IN NATIVE ARTERY: ICD-10-CM

## 2023-12-28 DIAGNOSIS — Z00.00 WELL ADULT EXAM: Primary | ICD-10-CM

## 2023-12-28 DIAGNOSIS — E03.9 HYPOTHYROIDISM, UNSPECIFIED TYPE: ICD-10-CM

## 2023-12-28 DIAGNOSIS — K21.9 GASTROESOPHAGEAL REFLUX DISEASE, UNSPECIFIED WHETHER ESOPHAGITIS PRESENT: ICD-10-CM

## 2023-12-28 DIAGNOSIS — E11.59 TYPE 2 DIABETES MELLITUS WITH OTHER CIRCULATORY COMPLICATION, WITHOUT LONG-TERM CURRENT USE OF INSULIN (MULTI): ICD-10-CM

## 2023-12-28 DIAGNOSIS — E11.65 TYPE 2 DIABETES MELLITUS WITH HYPERGLYCEMIA, WITH LONG-TERM CURRENT USE OF INSULIN (MULTI): ICD-10-CM

## 2023-12-28 DIAGNOSIS — Z23 ENCOUNTER FOR IMMUNIZATION: ICD-10-CM

## 2023-12-28 DIAGNOSIS — Z98.61 S/P PTCA (PERCUTANEOUS TRANSLUMINAL CORONARY ANGIOPLASTY): ICD-10-CM

## 2023-12-28 DIAGNOSIS — E78.2 MIXED HYPERLIPIDEMIA: ICD-10-CM

## 2023-12-28 DIAGNOSIS — Z23 NEED FOR VACCINATION: ICD-10-CM

## 2023-12-28 DIAGNOSIS — E66.3 OVERWEIGHT: ICD-10-CM

## 2023-12-28 DIAGNOSIS — Z79.4 TYPE 2 DIABETES MELLITUS WITH HYPERGLYCEMIA, WITH LONG-TERM CURRENT USE OF INSULIN (MULTI): ICD-10-CM

## 2023-12-28 DIAGNOSIS — K59.09 CHRONIC CONSTIPATION: ICD-10-CM

## 2023-12-28 PROBLEM — K62.5 RECTAL BLEEDING: Status: RESOLVED | Noted: 2023-06-09 | Resolved: 2023-12-28

## 2023-12-28 PROCEDURE — 3074F SYST BP LT 130 MM HG: CPT | Performed by: FAMILY MEDICINE

## 2023-12-28 PROCEDURE — 3052F HG A1C>EQUAL 8.0%<EQUAL 9.0%: CPT | Performed by: FAMILY MEDICINE

## 2023-12-28 PROCEDURE — 3078F DIAST BP <80 MM HG: CPT | Performed by: FAMILY MEDICINE

## 2023-12-28 PROCEDURE — 90471 IMMUNIZATION ADMIN: CPT | Performed by: FAMILY MEDICINE

## 2023-12-28 PROCEDURE — 90677 PCV20 VACCINE IM: CPT | Performed by: FAMILY MEDICINE

## 2023-12-28 PROCEDURE — 99396 PREV VISIT EST AGE 40-64: CPT | Performed by: FAMILY MEDICINE

## 2023-12-28 PROCEDURE — 90686 IIV4 VACC NO PRSV 0.5 ML IM: CPT | Performed by: FAMILY MEDICINE

## 2023-12-28 PROCEDURE — 3062F POS MACROALBUMINURIA REV: CPT | Performed by: FAMILY MEDICINE

## 2023-12-28 PROCEDURE — 90472 IMMUNIZATION ADMIN EACH ADD: CPT | Performed by: FAMILY MEDICINE

## 2023-12-28 PROCEDURE — 1036F TOBACCO NON-USER: CPT | Performed by: FAMILY MEDICINE

## 2023-12-28 PROCEDURE — 99213 OFFICE O/P EST LOW 20 MIN: CPT | Performed by: FAMILY MEDICINE

## 2023-12-28 PROCEDURE — 3049F LDL-C 100-129 MG/DL: CPT | Performed by: FAMILY MEDICINE

## 2023-12-28 RX ORDER — INSULIN GLARGINE 100 [IU]/ML
40 INJECTION, SOLUTION SUBCUTANEOUS NIGHTLY
Qty: 36 ML | Refills: 1 | Status: CANCELLED | OUTPATIENT
Start: 2023-12-28 | End: 2024-06-25

## 2023-12-28 RX ORDER — ROSUVASTATIN CALCIUM 40 MG/1
40 TABLET, COATED ORAL DAILY
Qty: 90 TABLET | Refills: 3 | Status: SHIPPED | OUTPATIENT
Start: 2023-12-28 | End: 2024-12-27

## 2023-12-28 NOTE — ASSESSMENT & PLAN NOTE
Discussed importance of glucose control in CAD. She has A1C down to 8.7 from 12.8. A1C has been above 10 for at least 5 years. Referred ophthalmology. [facility]up [facility] as they are adjusting medications. Sees them next month. Will let me know if does not hear from pharmacy.

## 2023-12-28 NOTE — PROGRESS NOTES
4Subjective   Patient ID: Cleopatra Duncan is a 53 y.o. female who presents for follow up .    Here for follow up DM, cholesterol. Had moved from elsewhere.     DM2 - had A1C upt ot 12.7 last visit. Had repeat done. Needs eye exam, foot exam done today. Due for flu and pneumonia vaccines. She is on Jardiance 10 mg daily, was started on Rybelsus 3 mg which was changed to Mounjaro 2.5 mg in October by Dr. Galvin.  Neither has not been uptitrated. Tolerating it well. On statin. Not on ARB or ACEI. Sees a cardiologist. Has CAD, stent. On Plavis, Imdur, statin. Had labs done before visit. Microalbumin done this month. Has not seen eye doctor lately. Work told her A1C down to 7.0. Was referred to [facility] clinic. She was told there to cut Lantus back if less than 100 when checks it. [facility] rescheduled her to 1/17 and she called the pharmacy mike there and waiting for call back. She is running out of Lantus. Met with nutrition. Her follow up was to be today but is rescheduled.     Hyperlipidemia - on Atorvastatin 80 mg, had lab done and here for follow up. Has appt with cardiology next month. TG and LDL went up. Had lot of sweets during the holidays. Takes medication every day at night.     Hypothyroid = increased to 250 mcg daily last visit. Was out for a week. Has not come in from mail order. Will recheck lab about 6 weeks after resume. Insurance not letting her use  pharmacy.     Here for annual wellness exam. Last wellness unknown.     New concerns:no  Feels: fairly well    Changes  to health/medications since last visit Increased Levothyroxine to 250. Dr. Galvin started her on Mounjaro several months ago as well as Jardiance 10 mg. Still on 2.5 mg weekly of Mounjaro  Other providers seen since last visit NONE  Periods: hYSTERECTOMY 2022  Contraception: no    Healthy lifestyle habits: Regular exercise: room to improve, little walking. Goal is 150 minutes weekly.                                         Dietary habits:  have improved somewhat. Daughter baked her a lot of cookies.                                         Social connections/relationships, single, lives with son and his family.                                         Wears seatbelts Yes                                         Sunscreen Yes                                         Sexual Risk Factors No        Health screenings:  Pap smear: Hysterectomy for fibroids 2022                                  Mammogram was ordered in June, not scheduled, last one in 2019                                  Self-breast Exam No                                   Colon screening May 2022, polyps, repeat due after 5 years                                  Dexa not applicable                                  Hep C screening normal June 2023                                  HIV screening negative June 2023                                  STI screeningnot applicable                          Health risks: Domestic Violence no                      Work-life balance Yes -                       Smoke detectors Yes                       Carbon monoxide detectors yes                      Gun safety not applicable                      Second-hand Smoke No                       Occupational Risks no    Immunizations:  Influenza:  No                             Tdap: 7/7/2023                            HPV: not applicable                           Pneumonia: no,willing to get.                            Shingrix: no                           COVID: Moderna times 2. Due for booster.                      Current Outpatient Medications:     aspirin 81 mg EC tablet, Take 1 tablet (81 mg) by mouth once daily., Disp: , Rfl:     atorvastatin (Lipitor) 80 mg tablet, TAKE 1 TABLET BY MOUTH AT BEDTIME, Disp: 90 tablet, Rfl: 3    blood-glucose sensor device, USE 1 SENSOR EVERY 14 DAYS, Disp: 2 each, Rfl: 11    cholecalciferol (Vitamin D-3) 25 MCG (1000 UT) tablet, Take 1 tablet (25 mcg) by mouth  once daily., Disp: , Rfl:     docusate sodium (Colace) 100 mg capsule, Take by mouth once daily as needed., Disp: , Rfl:     empagliflozin (Jardiance) 10 mg, TAKE 1 TABLET BY MOUTH ONCE DAILY, Disp: 90 tablet, Rfl: 3    HumaLOG KwikPen Insulin 100 unit/mL injection, INJECT 20 UNITS UNDER THE SKIN 3 TIMES A DAY WITH MEALS. ADJUST PER SLIDING SCALE, NO MORE THAN 100 UNITS DAILY, Disp: 60 mL, Rfl: 1    isosorbide mononitrate ER (Imdur) 30 mg 24 hr tablet, Take 1 tablet (30 mg) by mouth once daily., Disp: , Rfl:     Lantus Solostar U-100 Insulin 100 unit/mL (3 mL) pen, Inject 40 Units under the skin once daily at bedtime., Disp: 36 mL, Rfl: 1    levothyroxine (Synthroid, Levoxyl) 200 mcg tablet, Take 1 tablet (200 mcg) by mouth once daily., Disp: 90 tablet, Rfl: 3    levothyroxine (Synthroid, Levoxyl) 50 mcg tablet, Take 1 tablet (50 mcg) by mouth once daily., Disp: 90 tablet, Rfl: 3    multivitamin-min-iron-FA-vit K (Multi-Day Plus Minerals) 18 mg iron-400 mcg-25 mcg tablet, Take 1 tablet by mouth once daily., Disp: , Rfl:     nitroglycerin (Nitrostat) 0.4 mg SL tablet, Place under the tongue., Disp: , Rfl:     pantoprazole (ProtoNix) 40 mg EC tablet, Take by mouth twice a day., Disp: , Rfl:     tirzepatide (Mounjaro) 2.5 mg/0.5 mL pen injector, INJECT 2.5MG UNDER THE SKIN ONCE EVERY WEEK, Disp: 2 mL, Rfl: 0    valACYclovir (Valtrex) 500 mg tablet, , Disp: , Rfl:     hydrocortisone acetate 2.5 % cream with perineal applicator, once daily., Disp: , Rfl:     rosuvastatin (Crestor) 40 mg tablet, Take 1 tablet (40 mg) by mouth once daily., Disp: 90 tablet, Rfl: 3    Patient Active Problem List   Diagnosis    Alternating constipation and diarrhea    CAD in native artery    Genital herpes in women    GERD (gastroesophageal reflux disease)    Hyperlipidemia    Hypothyroidism    Overweight    S/P PTCA (percutaneous transluminal coronary angioplasty)    Vitamin D deficiency    Type 2 diabetes mellitus with hyperglycemia, with  long-term current use of insulin (CMS/formerly [facility])    Chronic constipation    Type 2 diabetes mellitus with circulatory disorder, with long-term current use of insulin (CMS/formerly [facility])         Review of Systems   Constitutional:  Negative for activity change, appetite change, fatigue and unexpected weight change.   HENT:  Negative for dental problem, hearing loss and trouble swallowing.    Eyes:  Positive for visual disturbance.   Respiratory:  Negative for cough, shortness of breath and wheezing.    Cardiovascular:  Negative for chest pain, palpitations and leg swelling.   Gastrointestinal:  Negative for abdominal pain, blood in stool, constipation, diarrhea, nausea and vomiting.        Lot of heartburn. Is on Pantoprazole. Symptoms worse at night. Lot of belching. Burping a lot with foul taste. This has been going on for 30 days. Takes pantoprzole lunch and dinner.   Taking stool softeners every night, Fiber gummies - keep her regular.    Endocrine: Negative for cold intolerance, heat intolerance, polydipsia, polyphagia and polyuria.   Genitourinary:  Negative for difficulty urinating and menstrual problem.   Musculoskeletal:  Negative for arthralgias and joint swelling.   Neurological:  Negative for seizures, syncope and headaches.   Psychiatric/Behavioral:  Negative for confusion and dysphoric mood. The patient is not nervous/anxious.        Objective   /76 (BP Location: Left arm, Patient Position: Sitting, BP Cuff Size: Adult)   Pulse 90   Temp 36.5 °C (97.7 °F) (Temporal)   Wt 71.4 kg (157 lb 6.4 oz)   SpO2 98%   BMI 26.40 kg/m²     Physical Exam  Constitutional:       Appearance: Normal appearance.   HENT:      Head: Normocephalic and atraumatic.      Right Ear: Tympanic membrane normal.      Left Ear: Tympanic membrane normal.      Nose: Nose normal.      Mouth/Throat:      Pharynx: Oropharynx is clear.   Eyes:      Extraocular Movements: Extraocular movements intact.      Conjunctiva/sclera: Conjunctivae  normal.      Pupils: Pupils are equal, round, and reactive to light.   Neck:      Vascular: No carotid bruit.   Cardiovascular:      Rate and Rhythm: Normal rate and regular rhythm.      Pulses: Normal pulses.           Dorsalis pedis pulses are 2+ on the right side and 2+ on the left side.      Heart sounds: No murmur heard.  Pulmonary:      Effort: Pulmonary effort is normal.      Breath sounds: Normal breath sounds.   Abdominal:      Palpations: There is no hepatomegaly, splenomegaly or mass.      Tenderness: There is no abdominal tenderness.   Musculoskeletal:         General: Normal range of motion.      Cervical back: Normal range of motion.      Left lower leg: No edema.      Right foot: Normal range of motion. No deformity.      Left foot: Normal range of motion. No deformity.   Feet:      Right foot:      Protective Sensation: 10 sites tested.  10 sites sensed.      Skin integrity: Skin integrity normal.      Toenail Condition: Right toenails are normal.      Left foot:      Protective Sensation: 10 sites tested.  10 sites sensed.      Skin integrity: Skin integrity normal.      Toenail Condition: Left toenails are normal.   Skin:     General: Skin is warm and dry.      Findings: No rash.   Neurological:      General: No focal deficit present.      Mental Status: She is alert. Mental status is at baseline.      Gait: Gait is intact.   Psychiatric:         Mood and Affect: Mood normal.         Thought Content: Thought content normal.       Recent Results (from the past 672 hour(s))   Comprehensive Metabolic Panel    Collection Time: 12/26/23  9:24 AM   Result Value Ref Range    Glucose 252 (H) 74 - 99 mg/dL    Sodium 138 136 - 145 mmol/L    Potassium 4.1 3.5 - 5.3 mmol/L    Chloride 102 98 - 107 mmol/L    Bicarbonate 26 21 - 32 mmol/L    Anion Gap 14 10 - 20 mmol/L    Urea Nitrogen 15 6 - 23 mg/dL    Creatinine 0.80 0.50 - 1.05 mg/dL    eGFR 88 >60 mL/min/1.73m*2    Calcium 8.9 8.6 - 10.3 mg/dL    Albumin  3.8 3.4 - 5.0 g/dL    Alkaline Phosphatase 118 (H) 33 - 110 U/L    Total Protein 6.5 6.4 - 8.2 g/dL    AST 19 9 - 39 U/L    Bilirubin, Total 0.4 0.0 - 1.2 mg/dL    ALT 23 7 - 45 U/L   Hemoglobin A1C    Collection Time: 12/26/23  9:24 AM   Result Value Ref Range    Hemoglobin A1C 8.7 (H) see below %    Estimated Average Glucose 203 Not Established mg/dL   Lipid Panel    Collection Time: 12/26/23  9:24 AM   Result Value Ref Range    Cholesterol 195 0 - 199 mg/dL    HDL-Cholesterol 42.2 mg/dL    Cholesterol/HDL Ratio 4.6     LDL Calculated 100 (H) <=99 mg/dL    VLDL 53 (H) 0 - 40 mg/dL    Triglycerides 263 (H) 0 - 149 mg/dL    Non HDL Cholesterol 153 (H) 0 - 149 mg/dL   Thyroxine, Free    Collection Time: 12/26/23  9:24 AM   Result Value Ref Range    Thyroxine, Free 0.57 (L) 0.61 - 1.12 ng/dL   Thyroid Stimulating Hormone    Collection Time: 12/26/23  9:24 AM   Result Value Ref Range    Thyroid Stimulating Hormone 25.24 (H) 0.44 - 3.98 mIU/L   Albumin , Urine Random    Collection Time: 12/26/23  9:24 AM   Result Value Ref Range    Albumin, Urine Random <7.0 Not established mg/L    Creatinine, Urine Random 68.5 20.0 - 320.0 mg/dL    Albumin/Creatine Ratio         Assessment/Plan   Problem List Items Addressed This Visit       CAD in native artery     Had PTCA/stent. On Atorvastatin, Imdur, Jardiance 10 mg, NTG at home but not needing. Has follow up in cardiology next month         GERD (gastroesophageal reflux disease)     Suspect increase in sx due to Mounjaro. Will hold the Mounjaro for a week and see if better. If not, GI evaluation. On Protonix 40 mg bid.          Hyperlipidemia     LDL not at goal - increased to 100 from 63 and TG also went up. On Atorvastatin 80 mg daily. Discussed how she is taking medication. Has appt with cardiology upcoming, change to Rosuvastatin 40 mg daily.          Relevant Medications    rosuvastatin (Crestor) 40 mg tablet    Hypothyroidism     Ncreased to 250 mcg daily last visit. TSH up  markedly from 12.94 to 24. T4 down to 0.53. Discussed compliance as T4 going down when dose increased. She has been waiting on medication from [facility] and not taken for at least a week. Resume and recheck labs after 6 weeks.          Overweight     Weight down 6 lbs. Congratulated.          S/P PTCA (percutaneous transluminal coronary angioplasty)     Has follow up appointment with Dr. Galvin in January. No recent changes in medications. Exercise encouraged. No current symptoms. Plavix was stopped per patient.          Type 2 diabetes mellitus with hyperglycemia, with long-term current use of insulin (CMS/[facility])     A1C down to 8.7 from 12.8, C Peptide normal. Reviwed labs from cardiology as well as my own results. Congratulated on progress. Goal is to get A1C below 7.   Jardiance 10 mg and cardiology switched her Rybelsus 3 mg to Mounjaro 2.5. Due for titration both. Has cut back on sliding scale mealtime insulin. Foot exam today. Due for eye visit.          Relevant Medications    rosuvastatin (Crestor) 40 mg tablet    Other Relevant Orders    Referral to Ophthalmology    Chronic constipation     Using Fiber gummies, Miralax and stool softeners.          Type 2 diabetes mellitus with circulatory disorder, with long-term current use of insulin (CMS/[facility])     Discussed importance of glucose control in CAD. She has A1C down to 8.7 from 12.8. A1C has been above 10 for at least 5 years. Referred ophthalmology. Followup in [facility] as they are adjusting medications. Sees them next month. Will let me know if does not hear from pharmacy.           Other Visit Diagnoses       Well adult exam    -  Primary    Discusse healthy lifestyle, health maintenance, immunization recommendations, goals of therapy.    Need for vaccination        Due for flu and Prevnar, given today.              Assessment, plans, tests, and follow up discussed with patient and patient verbalized understanding. Cleopatra was given an opportunity to ask  questions and  any concerns were addressed including but not limited to preventive measures, secondary prevention of cardiovascular disease and other complications of diabetes, need for eye exam, need to discuss Lantus with [facility] pharmacist and let me know if issue getting it. .

## 2023-12-28 NOTE — ASSESSMENT & PLAN NOTE
LDL not at goal - increased to 100 from 63 and TG also went up. On Atorvastatin 80 mg daily. Discussed how she is taking medication. Has appt with cardiology upcoming, change to Rosuvastatin 40 mg daily.

## 2023-12-28 NOTE — PATIENT INSTRUCTIONS
Resume your thyroid medication. Check level in 6 weeks.     Change to Rosuvastatin 40 mg daily from Atorvastatin 80 mg daily.     Have labs in 6 weeks or so.

## 2023-12-28 NOTE — ASSESSMENT & PLAN NOTE
Ncreased to 250 mcg daily last visit. TSH up markedly from 12.94 to 24. T4 down to 0.53. Discussed compliance as T4 going down when dose increased. She has been waiting on medication from [facility] and not taken for at least a week. Resume and recheck labs after 6 weeks.

## 2023-12-28 NOTE — ASSESSMENT & PLAN NOTE
A1C down to 8.7 from 12.8, C Peptide normal. Reviwed labs from cardiology as well as my own results. Congratulated on progress. Goal is to get A1C below 7.   Jardiance 10 mg and cardiology switched her Rybelsus 3 mg to Mounjaro 2.5. Due for titration both. Has cut back on sliding scale mealtime insulin. Foot exam today. Due for eye visit.

## 2023-12-28 NOTE — ASSESSMENT & PLAN NOTE
Suspect increase in sx due to Mounjaro. Will hold the Mounjaro for a week and see if better. If not, GI evaluation. On Protonix 40 mg bid.

## 2023-12-28 NOTE — ASSESSMENT & PLAN NOTE
Had PTCA/stent. On Atorvastatin, Imdur, Jardiance 10 mg, NTG at home but not needing. Has follow up in cardiology next month

## 2023-12-28 NOTE — ASSESSMENT & PLAN NOTE
Has follow up appointment with Dr. Galvin in January. No recent changes in medications. Exercise encouraged. No current symptoms. Plavix was stopped per patient.

## 2023-12-29 DIAGNOSIS — E11.59 TYPE 2 DIABETES MELLITUS WITH OTHER CIRCULATORY COMPLICATION, WITHOUT LONG-TERM CURRENT USE OF INSULIN (MULTI): ICD-10-CM

## 2023-12-29 RX ORDER — INSULIN GLARGINE 100 [IU]/ML
40 INJECTION, SOLUTION SUBCUTANEOUS NIGHTLY
Qty: 15 ML | Refills: 5 | Status: SHIPPED | OUTPATIENT
Start: 2023-12-29 | End: 2024-05-01

## 2023-12-29 NOTE — PROGRESS NOTES
Maryann contacted for a refill of her Lantus solostar to be sent to [facility] pharmacy. An updated prescription for Lantus solostar #15 mL 40 units daily has been sent.     Last [facility] visit 8/25/2023     Patient has her [facility] follow up 1/17/2024.     Gino Farmer, PharmD

## 2024-01-15 PROBLEM — L29.0 PRURITUS ANI: Status: ACTIVE | Noted: 2024-01-15

## 2024-01-15 PROBLEM — R07.9 CHEST PAIN ON EXERTION: Status: ACTIVE | Noted: 2022-12-02

## 2024-01-15 PROBLEM — I10 BENIGN ESSENTIAL HYPERTENSION: Status: ACTIVE | Noted: 2023-08-23

## 2024-01-15 PROBLEM — R06.09 DYSPNEA ON EXERTION: Status: ACTIVE | Noted: 2024-01-15

## 2024-01-15 PROBLEM — G80.9 CEREBRAL PALSY (MULTI): Status: ACTIVE | Noted: 2024-01-15

## 2024-01-15 PROBLEM — Z86.39 HISTORY OF METABOLIC DISORDER: Status: ACTIVE | Noted: 2024-01-15

## 2024-01-17 ENCOUNTER — NURSE ONLY (OUTPATIENT)
Dept: CARDIOLOGY | Facility: CLINIC | Age: 54
End: 2024-01-17

## 2024-01-17 ENCOUNTER — OFFICE VISIT (OUTPATIENT)
Dept: CARDIOLOGY | Facility: CLINIC | Age: 54
End: 2024-01-17
Payer: COMMERCIAL

## 2024-01-17 VITALS
HEART RATE: 85 BPM | RESPIRATION RATE: 18 BRPM | SYSTOLIC BLOOD PRESSURE: 113 MMHG | WEIGHT: 156 LBS | BODY MASS INDEX: 26.16 KG/M2 | OXYGEN SATURATION: 97 % | DIASTOLIC BLOOD PRESSURE: 75 MMHG

## 2024-01-17 DIAGNOSIS — I25.10 CAD IN NATIVE ARTERY: Primary | ICD-10-CM

## 2024-01-17 DIAGNOSIS — E11.59 TYPE 2 DIABETES MELLITUS WITH OTHER CIRCULATORY COMPLICATION, WITHOUT LONG-TERM CURRENT USE OF INSULIN (MULTI): ICD-10-CM

## 2024-01-17 DIAGNOSIS — Z79.4 TYPE 2 DIABETES MELLITUS WITH HYPERGLYCEMIA, WITH LONG-TERM CURRENT USE OF INSULIN (MULTI): ICD-10-CM

## 2024-01-17 DIAGNOSIS — E78.2 MIXED HYPERLIPIDEMIA: ICD-10-CM

## 2024-01-17 DIAGNOSIS — E78.1 HYPERTRIGLYCERIDEMIA: ICD-10-CM

## 2024-01-17 DIAGNOSIS — I10 BENIGN ESSENTIAL HYPERTENSION: ICD-10-CM

## 2024-01-17 DIAGNOSIS — E11.65 TYPE 2 DIABETES MELLITUS WITH HYPERGLYCEMIA, WITH LONG-TERM CURRENT USE OF INSULIN (MULTI): ICD-10-CM

## 2024-01-17 DIAGNOSIS — Z98.61 S/P PTCA (PERCUTANEOUS TRANSLUMINAL CORONARY ANGIOPLASTY): ICD-10-CM

## 2024-01-17 PROCEDURE — 3074F SYST BP LT 130 MM HG: CPT | Performed by: INTERNAL MEDICINE

## 2024-01-17 PROCEDURE — 3078F DIAST BP <80 MM HG: CPT | Performed by: INTERNAL MEDICINE

## 2024-01-17 PROCEDURE — 99215 OFFICE O/P EST HI 40 MIN: CPT | Performed by: INTERNAL MEDICINE

## 2024-01-17 PROCEDURE — 1036F TOBACCO NON-USER: CPT | Performed by: INTERNAL MEDICINE

## 2024-01-17 RX ORDER — ICOSAPENT ETHYL 1 G/1
2 CAPSULE ORAL
Qty: 120 CAPSULE | Refills: 11 | Status: SHIPPED | OUTPATIENT
Start: 2024-01-17 | End: 2025-01-16

## 2024-01-17 RX ORDER — TIRZEPATIDE 5 MG/.5ML
5 INJECTION, SOLUTION SUBCUTANEOUS
Qty: 2 ML | Refills: 2 | Status: SHIPPED | OUTPATIENT
Start: 2024-01-17 | End: 2024-05-01 | Stop reason: ALTCHOICE

## 2024-01-17 ASSESSMENT — ENCOUNTER SYMPTOMS
ENDOCRINE NEGATIVE: 1
GASTROINTESTINAL NEGATIVE: 1
MUSCULOSKELETAL NEGATIVE: 1
PSYCHIATRIC NEGATIVE: 1
EYES NEGATIVE: 1
HEMATOLOGIC/LYMPHATIC NEGATIVE: 1
NEUROLOGICAL NEGATIVE: 1
ALLERGIC/IMMUNOLOGIC NEGATIVE: 1
RESPIRATORY NEGATIVE: 1
CARDIOVASCULAR NEGATIVE: 1
CONSTITUTIONAL NEGATIVE: 1

## 2024-01-17 ASSESSMENT — PAIN SCALES - GENERAL: PAINLEVEL: 0-NO PAIN

## 2024-01-17 NOTE — PROGRESS NOTES
[facility] for Integrated and Novel Approaches in Vascular-Metabolic Disease ([facility]) Note    Reason for Visit: Follow-up visit  Referring Clinician: No ref. provider found     History of Present Illness: Mrs. Duncan is a 53-year-old woman with coronary artery disease status post PCI to the LAD in 2021 (nl LVEF 60% last echo) complicated by poorly controlled type 2 diabetes mellitus, hypertension, hyperlipidemia, and elevated BMI who was referred to the [facility] clinic by her PCP for cardiometabolic risk factor optimization and is here for follow-up visit.  Since her initial visit she has made great strides and risk factor control and her hemoglobin A1c went from 12% to 8.7%.  She is tolerating the medications well without any adverse effects although she has been 1 week without her tirzepatide 2.5 mg.  She does not have any recurrent chest pain, shortness of breath, palpitations, or syncope.  Her most recent CGM data from this January shows target range 15% with high range the rest of the time.  However she did have dietary indiscretion due to the holidays.  Additional laboratory testing for cardiovascular risk/premature CAD showed normal LP(a) but elevated hs-CRP.  Also her cholesterol numbers increased including LDL and triglycerides despite atorvastatin 80 so she was recently switched to rosuvastatin 40 but she has not started this medication yet.    Past Medical History:  She has a past medical history of Cholecystitis (06/09/2023), Cyst of breast (07/07/2023), Rectal bleeding (06/09/2023), Type 2 diabetes mellitus with circulatory disorder, with long-term current use of insulin (CMS/[facility]) (12/28/2023), Type 2 diabetes mellitus with hyperglycemia, with long-term current use of insulin (CMS/[facility]) (08/09/2011), and Vertigo (07/07/2023).    Past Surgical History:  She has a past surgical history that includes Other surgical history (07/16/2019) and Other surgical history (07/16/2019).    Social History:  She reports that  she has never smoked. She has never used smokeless tobacco. She reports current alcohol use. She reports that she does not use drugs.    Family History:  Family History   Problem Relation Name Age of Onset    Hypertension Father      Prostate cancer Father      Other (bladder cancer) Maternal Grandmother      Breast cancer Maternal Grandmother         Allergies:  Oxycodone-acetaminophen    Outpatient Medications:  Current Outpatient Medications   Medication Instructions    aspirin 81 mg EC tablet 1 tablet, oral, Daily    blood-glucose sensor device USE 1 SENSOR EVERY 14 DAYS    cholecalciferol (Vitamin D-3) 25 MCG (1000 UT) tablet 1 tablet, oral, Daily    docusate sodium (Colace) 100 mg capsule oral, Daily PRN    empagliflozin (Jardiance) 10 mg TAKE 1 TABLET BY MOUTH ONCE DAILY    HumaLOG KwikPen Insulin 100 unit/mL injection INJECT 20 UNITS UNDER THE SKIN 3 TIMES A DAY WITH MEALS. ADJUST PER SLIDING SCALE, NO MORE THAN 100 UNITS DAILY    hydrocortisone acetate 2.5 % cream with perineal applicator Daily RT    insulin glargine (LANTUS SOLOSTAR U-100 INSULIN) 40 Units, subcutaneous, Nightly, Take as directed per insulin instructions.    isosorbide mononitrate ER (Imdur) 30 mg 24 hr tablet 1 tablet, oral, Daily    levothyroxine (SYNTHROID, LEVOXYL) 50 mcg, oral, Daily    levothyroxine (SYNTHROID, LEVOXYL) 200 mcg, oral, Daily    multivitamin-min-iron-FA-vit K (Multi-Day Plus Minerals) 18 mg iron-400 mcg-25 mcg tablet 1 tablet, oral, Daily    nitroglycerin (Nitrostat) 0.4 mg SL tablet sublingual    pantoprazole (ProtoNix) 40 mg EC tablet oral, 2 times daily    rosuvastatin (CRESTOR) 40 mg, oral, Daily    tirzepatide (Mounjaro) 2.5 mg/0.5 mL pen injector INJECT 2.5MG UNDER THE SKIN ONCE EVERY WEEK    valACYclovir (Valtrex) 500 mg tablet        Review of Systems:  Review of Systems   Constitutional: Negative.   HENT: Negative.     Eyes: Negative.    Cardiovascular: Negative.    Respiratory: Negative.     Endocrine:  Negative.    Hematologic/Lymphatic: Negative.    Skin: Negative.    Musculoskeletal: Negative.    Gastrointestinal: Negative.    Genitourinary: Negative.    Neurological: Negative.    Psychiatric/Behavioral: Negative.     Allergic/Immunologic: Negative.        Last Recorded Vitals:  Vitals:    01/17/24 1124   BP: 113/75   BP Location: Left arm   Patient Position: Sitting   Pulse: 85   Resp: 18   SpO2: 97%   Weight: 70.8 kg (156 lb)       Physical Examination:  General: Well appearing, well-nourished, in no acute distress.  HEENT: Normocephalic atraumatic, pupils equal and reactive to light, extraocular muscles intact, no conjunctival injection, oropharynx clear without exudates.  Neck: Normal carotid arterial pulses, no arterial bruits, no thyromegaly.  Cardiac: Regular rhythm and normal heart rate.  S1, S2 present and normal.  No murmurs, rubs, or gallops.  PMI is nondisplaced. Jugular venous pulsations are normal.  Pulmonary: Normal breath sounds, no increased work of breathing, no wheezes or crackles.  GI: Normal bowel sounds, abdominal aorta not enlarged, no hepatosplenomegaly, no abdominal bruits.  Lower extremities: No cyanosis, clubbing, or edema.  No xanthelasma present. Normal distal pulses.  Skin: Skin intact. No significant rashes or lesions present.  Neuro: Alert and oriented x 3, normal attention and cognition, no focal motor or sensory neurologic deficits.  Psych: Normal affect and mood.  Musculoskeletal: Normal gait normal muscle tone.    Laboratory Studies:  Lab Results   Component Value Date    GLUCOSE 252 (H) 12/26/2023    CALCIUM 8.9 12/26/2023     12/26/2023    K 4.1 12/26/2023    CO2 26 12/26/2023     12/26/2023    BUN 15 12/26/2023    CREATININE 0.80 12/26/2023     Lab Results   Component Value Date    ALT 23 12/26/2023    AST 19 12/26/2023    ALKPHOS 118 (H) 12/26/2023    BILITOT 0.4 12/26/2023         Lab Results   Component Value Date    CHOL 195 12/26/2023    CHOL 136 08/12/2023  "   CHOL 133 12/02/2022     Lab Results   Component Value Date    HDL 42.2 12/26/2023    HDL 37.7 (A) 08/12/2023    HDL 41.2 12/02/2022     Lab Results   Component Value Date    LDLCALC 100 (H) 12/26/2023     Lab Results   Component Value Date    TRIG 263 (H) 12/26/2023    TRIG 153 (H) 08/12/2023    TRIG 123 12/02/2022     No components found for: \"CHOLHDL\"  Lab Results   Component Value Date    HGBA1C 8.7 (H) 12/26/2023     UACR<30 mg/g    Assessment and Plan:  Problem List Items Addressed This Visit          Cardiac and Vasculature    CAD in native artery - Primary    Hyperlipidemia    S/P PTCA (percutaneous transluminal coronary angioplasty)    Benign essential hypertension       Endocrine/Metabolic    Type 2 diabetes mellitus with hyperglycemia, with long-term current use of insulin (CMS/[facility])     Mrs. Duncan is a 53-year-old woman with coronary artery disease status post PCI to the LAD in 2021 (nl LVEF 60% last echo) complicated by poorly controlled type 2 diabetes mellitus, hypertension, hyperlipidemia, and elevated BMI who [facility] by her PCP for cardiometabolic risk factor optimization and is here for follow-up visit.  Our discussion today included:    Increase the dose of tirzepatide to 5 mg once weekly and we will work with pharmacy to ensure insurance coverage.  Increase her nighttime insulin from 40 to 45 units long-acting for better glycemic control initially.  Stop atorvastatin and start rosuvastatin as recommended by her PCP.  We could also consider the addition of ezetimibe or even a PCSK9 inhibitor if her LDL cholesterol goals are not met at next visit.  Start Vascepa 2 g twice daily for hypertriglyceridemia in the setting of secondary prevention of ASCVD.  In the future we could consider the addition of colchicine to reduce inflammation related to ASCVD or even a new medication such as ziltivekumab which is being studied for high hs-CRP and secondary prevention of ASCVD.    I " will see her again in 3 months here in the office for routine follow-up with repeat laboratory studies at that time.  Please do not hesitate to call or contact me with any questions or concerns.    I spent 45 minutes of face-to-face time with the patient, with more than 50% of that time spent in counseling and/or coordination of care.    Minh Galvin MD, FAJAKUB, FACC  Director,  Center for Cardiovascular Prevention  Director, [facility] Program  Associate Professor of Medicine  [employer]

## 2024-01-19 PROBLEM — E78.1 HYPERTRIGLYCERIDEMIA: Status: ACTIVE | Noted: 2024-01-19

## 2024-01-19 NOTE — PROGRESS NOTES
[facility] ([facility]) Nursing Note FUV    Name: Cleopatra Duncan    Referring Clinician:     Cardiometabolic Problem List: T2DM, BMI 26.16, HTN, CAD, HLD, PTCA    Past Medical History:  She has a past medical history of Cholecystitis (06/09/2023), Cyst of breast (07/07/2023), Rectal bleeding (06/09/2023), Type 2 diabetes mellitus with circulatory disorder, with long-term current use of insulin (CMS/[facility]) (12/28/2023), Type 2 diabetes mellitus with hyperglycemia, with long-term current use of insulin (CMS/[facility]) (08/09/2011), and Vertigo (07/07/2023).    Past Surgical History:  She has a past surgical history that includes Other surgical history (07/16/2019) and Other surgical history (07/16/2019).    Social History:  She reports that she has never smoked. She has never used smokeless tobacco. She reports current alcohol use. She reports that she does not use drugs.    Family History:  Family History   Problem Relation Name Age of Onset    Hypertension Father      Prostate cancer Father      Other (bladder cancer) Maternal Grandmother      Breast cancer Maternal Grandmother         Allergies:  Oxycodone-acetaminophen    Outpatient Medications:  Current Outpatient Medications   Medication Instructions    aspirin 81 mg EC tablet 1 tablet, oral, Daily    blood-glucose sensor device USE 1 SENSOR EVERY 14 DAYS    cholecalciferol (Vitamin D-3) 25 MCG (1000 UT) tablet 1 tablet, oral, Daily    docusate sodium (Colace) 100 mg capsule oral, Daily PRN    empagliflozin (Jardiance) 10 mg TAKE 1 TABLET BY MOUTH ONCE DAILY    HumaLOG KwikPen Insulin 100 unit/mL injection INJECT 20 UNITS UNDER THE SKIN 3 TIMES A DAY WITH MEALS. ADJUST PER SLIDING SCALE, NO MORE THAN 100 UNITS DAILY    hydrocortisone acetate 2.5 % cream with perineal applicator Daily RT    icosapent ethyL (VASCEPA) 2 g, oral, 2 times daily with meals    insulin glargine (LANTUS SOLOSTAR U-100 INSULIN) 40 Units, subcutaneous, Nightly, Take as directed per insulin  "instructions.    isosorbide mononitrate ER (Imdur) 30 mg 24 hr tablet 1 tablet, oral, Daily    levothyroxine (SYNTHROID, LEVOXYL) 50 mcg, oral, Daily    levothyroxine (SYNTHROID, LEVOXYL) 200 mcg, oral, Daily    Mounjaro 5 mg, subcutaneous, Every 7 days    multivitamin-min-iron-FA-vit K (Multi-Day Plus Minerals) 18 mg iron-400 mcg-25 mcg tablet 1 tablet, oral, Daily    nitroglycerin (Nitrostat) 0.4 mg SL tablet sublingual    pantoprazole (ProtoNix) 40 mg EC tablet oral, 2 times daily    rosuvastatin (CRESTOR) 40 mg, oral, Daily    tirzepatide (Mounjaro) 2.5 mg/0.5 mL pen injector INJECT 2.5MG UNDER THE SKIN ONCE EVERY WEEK    valACYclovir (Valtrex) 500 mg tablet        Last Recorded Vitals:      6/2/2023    11:26 AM 6/9/2023    11:28 AM 7/7/2023    10:01 AM 8/23/2023    10:36 AM 8/23/2023    10:39 AM 12/28/2023     9:07 AM 1/17/2024    11:24 AM   Vitals   Systolic  117 106 118 112 111 113   Diastolic  78 74 81 76 76 75   Heart Rate 88 89 84   90 85   Temp  35.6 °C (96 °F) 36.1 °C (97 °F)   36.5 °C (97.7 °F)    Resp  17     18   Height (in)  1.6 m (5' 3\") 1.6 m (5' 3\") 1.645 m (5' 4.75\")      Weight (lb)  160.8 161.2 163.56  157.4 156   BMI  28.48 kg/m2 28.56 kg/m2 27.43 kg/m2  26.4 kg/m2 26.16 kg/m2   BSA (m2)  1.8 m2 1.8 m2 1.84 m2  1.81 m2 1.8 m2   Visit Report  Report Report   Report Report       Laboratory Studies:  Lab Results   Component Value Date    GLUCOSE 252 (H) 12/26/2023    K 4.1 12/26/2023    CREATININE 0.80 12/26/2023     Lab Results   Component Value Date    ALT 23 12/26/2023    AST 19 12/26/2023    ALKPHOS 118 (H) 12/26/2023    BILITOT 0.4 12/26/2023     Lab Results   Component Value Date    CHOL 195 12/26/2023    CHOL 136 08/12/2023    CHOL 133 12/02/2022     Lab Results   Component Value Date    HDL 42.2 12/26/2023    HDL 37.7 (A) 08/12/2023    HDL 41.2 12/02/2022     Lab Results   Component Value Date    LDLCALC 100 (H) 12/26/2023     Lab Results   Component Value Date    TRIG 263 (H) 12/26/2023 " "   TRIG 153 (H) 08/12/2023    TRIG 123 12/02/2022     No components found for: \"CHOLHDL\"  No components found for: \"UACR\"  Lab Results   Component Value Date    HGBA1C 8.7 (H) 12/26/2023    HGBA1C 12.8 (A) 06/02/2023    HGBA1C 11.9 03/05/2021         Cardiology Tests:  ECG:No results found for this or any previous visit from the past 1095 days.    Echo:No results found for this or any previous visit from the past 1095 days.    Cath:No results found for this or any previous visit from the past 1095 days.    Stress Test:No results found for this or any previous visit from the past 1095 days.    Cardiac Imaging:No results found for this or any previous visit from the past 1095 days.      Assessment/Plan     Patient-Centered Goals:    Make exercise plan, Start Vacepa, FU with pharmacy, Tirtrate Mounjaro, FUV in 3 months with labs    Notable Changes:   A1C    Referral Orders Placed Today:    Other:  None needed              "

## 2024-01-23 PROCEDURE — RXMED WILLOW AMBULATORY MEDICATION CHARGE

## 2024-01-27 ENCOUNTER — PHARMACY VISIT (OUTPATIENT)
Dept: PHARMACY | Facility: CLINIC | Age: 54
End: 2024-01-27
Payer: COMMERCIAL

## 2024-02-09 ENCOUNTER — APPOINTMENT (OUTPATIENT)
Dept: PHARMACY | Facility: HOSPITAL | Age: 54
End: 2024-02-09
Payer: COMMERCIAL

## 2024-04-25 DIAGNOSIS — Z79.4 TYPE 2 DIABETES MELLITUS WITH OTHER CIRCULATORY COMPLICATION, WITH LONG-TERM CURRENT USE OF INSULIN (MULTI): Primary | ICD-10-CM

## 2024-04-25 DIAGNOSIS — E11.59 TYPE 2 DIABETES MELLITUS WITH OTHER CIRCULATORY COMPLICATION, WITH LONG-TERM CURRENT USE OF INSULIN (MULTI): Primary | ICD-10-CM

## 2024-04-30 ENCOUNTER — LAB (OUTPATIENT)
Dept: LAB | Facility: LAB | Age: 54
End: 2024-04-30
Payer: COMMERCIAL

## 2024-04-30 DIAGNOSIS — Z79.4 TYPE 2 DIABETES MELLITUS WITH OTHER CIRCULATORY COMPLICATION, WITH LONG-TERM CURRENT USE OF INSULIN (MULTI): ICD-10-CM

## 2024-04-30 DIAGNOSIS — E11.59 TYPE 2 DIABETES MELLITUS WITH OTHER CIRCULATORY COMPLICATION, WITH LONG-TERM CURRENT USE OF INSULIN (MULTI): ICD-10-CM

## 2024-04-30 LAB
ALBUMIN SERPL BCP-MCNC: 4 G/DL (ref 3.4–5)
ALP SERPL-CCNC: 131 U/L (ref 33–110)
ALT SERPL W P-5'-P-CCNC: 19 U/L (ref 7–45)
ANION GAP SERPL CALC-SCNC: 10 MMOL/L (ref 10–20)
AST SERPL W P-5'-P-CCNC: 15 U/L (ref 9–39)
BILIRUB SERPL-MCNC: 0.5 MG/DL (ref 0–1.2)
BUN SERPL-MCNC: 27 MG/DL (ref 6–23)
CALCIUM SERPL-MCNC: 9 MG/DL (ref 8.6–10.3)
CHLORIDE SERPL-SCNC: 102 MMOL/L (ref 98–107)
CHOLEST SERPL-MCNC: 173 MG/DL (ref 0–199)
CHOLESTEROL/HDL RATIO: 4.3
CO2 SERPL-SCNC: 29 MMOL/L (ref 21–32)
CREAT SERPL-MCNC: 0.79 MG/DL (ref 0.5–1.05)
CREAT UR-MCNC: 193 MG/DL (ref 20–320)
EGFRCR SERPLBLD CKD-EPI 2021: 89 ML/MIN/1.73M*2
GLUCOSE SERPL-MCNC: 238 MG/DL (ref 74–99)
HDLC SERPL-MCNC: 40.5 MG/DL
LDLC SERPL CALC-MCNC: 98 MG/DL
MICROALBUMIN UR-MCNC: 18 MG/L
MICROALBUMIN/CREAT UR: 9.3 UG/MG CREAT
NON HDL CHOLESTEROL: 133 MG/DL (ref 0–149)
POTASSIUM SERPL-SCNC: 4.4 MMOL/L (ref 3.5–5.3)
PROT SERPL-MCNC: 6.9 G/DL (ref 6.4–8.2)
SODIUM SERPL-SCNC: 137 MMOL/L (ref 136–145)
TRIGL SERPL-MCNC: 171 MG/DL (ref 0–149)
VLDL: 34 MG/DL (ref 0–40)

## 2024-04-30 PROCEDURE — 82043 UR ALBUMIN QUANTITATIVE: CPT

## 2024-04-30 PROCEDURE — 80053 COMPREHEN METABOLIC PANEL: CPT

## 2024-04-30 PROCEDURE — 80061 LIPID PANEL: CPT

## 2024-04-30 PROCEDURE — 36415 COLL VENOUS BLD VENIPUNCTURE: CPT

## 2024-04-30 PROCEDURE — 83036 HEMOGLOBIN GLYCOSYLATED A1C: CPT

## 2024-04-30 PROCEDURE — 82570 ASSAY OF URINE CREATININE: CPT

## 2024-05-01 ENCOUNTER — OFFICE VISIT (OUTPATIENT)
Dept: CARDIOLOGY | Facility: CLINIC | Age: 54
End: 2024-05-01
Payer: COMMERCIAL

## 2024-05-01 VITALS
DIASTOLIC BLOOD PRESSURE: 73 MMHG | HEART RATE: 87 BPM | OXYGEN SATURATION: 96 % | BODY MASS INDEX: 25.96 KG/M2 | WEIGHT: 155.8 LBS | HEIGHT: 65 IN | SYSTOLIC BLOOD PRESSURE: 111 MMHG

## 2024-05-01 DIAGNOSIS — I10 BENIGN ESSENTIAL HYPERTENSION: ICD-10-CM

## 2024-05-01 DIAGNOSIS — E78.2 MIXED HYPERLIPIDEMIA: ICD-10-CM

## 2024-05-01 DIAGNOSIS — E78.1 HYPERTRIGLYCERIDEMIA: ICD-10-CM

## 2024-05-01 DIAGNOSIS — I25.10 CAD IN NATIVE ARTERY: Primary | ICD-10-CM

## 2024-05-01 DIAGNOSIS — Z98.61 S/P PTCA (PERCUTANEOUS TRANSLUMINAL CORONARY ANGIOPLASTY): ICD-10-CM

## 2024-05-01 DIAGNOSIS — Z79.4 TYPE 2 DIABETES MELLITUS WITH HYPERGLYCEMIA, WITH LONG-TERM CURRENT USE OF INSULIN (MULTI): ICD-10-CM

## 2024-05-01 DIAGNOSIS — E11.59 TYPE 2 DIABETES MELLITUS WITH OTHER CIRCULATORY COMPLICATION, WITHOUT LONG-TERM CURRENT USE OF INSULIN (MULTI): ICD-10-CM

## 2024-05-01 DIAGNOSIS — E11.65 TYPE 2 DIABETES MELLITUS WITH HYPERGLYCEMIA, WITH LONG-TERM CURRENT USE OF INSULIN (MULTI): ICD-10-CM

## 2024-05-01 PROCEDURE — 3048F LDL-C <100 MG/DL: CPT | Performed by: INTERNAL MEDICINE

## 2024-05-01 PROCEDURE — 3078F DIAST BP <80 MM HG: CPT | Performed by: INTERNAL MEDICINE

## 2024-05-01 PROCEDURE — 3061F NEG MICROALBUMINURIA REV: CPT | Performed by: INTERNAL MEDICINE

## 2024-05-01 PROCEDURE — 99215 OFFICE O/P EST HI 40 MIN: CPT | Performed by: INTERNAL MEDICINE

## 2024-05-01 PROCEDURE — 1036F TOBACCO NON-USER: CPT | Performed by: INTERNAL MEDICINE

## 2024-05-01 PROCEDURE — RXMED WILLOW AMBULATORY MEDICATION CHARGE

## 2024-05-01 PROCEDURE — 3074F SYST BP LT 130 MM HG: CPT | Performed by: INTERNAL MEDICINE

## 2024-05-01 RX ORDER — INSULIN GLARGINE 100 [IU]/ML
45 INJECTION, SOLUTION SUBCUTANEOUS NIGHTLY
Qty: 15 ML | Refills: 3 | Status: SHIPPED | OUTPATIENT
Start: 2024-05-01 | End: 2024-05-02 | Stop reason: SDUPTHER

## 2024-05-01 RX ORDER — SEMAGLUTIDE 0.68 MG/ML
0.25 INJECTION, SOLUTION SUBCUTANEOUS
Qty: 3 ML | Refills: 1 | Status: SHIPPED | OUTPATIENT
Start: 2024-05-05

## 2024-05-01 RX ORDER — EZETIMIBE 10 MG/1
10 TABLET ORAL DAILY
Qty: 90 TABLET | Refills: 3 | Status: SHIPPED | OUTPATIENT
Start: 2024-05-01 | End: 2025-05-01

## 2024-05-01 ASSESSMENT — ENCOUNTER SYMPTOMS
ALLERGIC/IMMUNOLOGIC NEGATIVE: 1
GASTROINTESTINAL NEGATIVE: 1
OCCASIONAL FEELINGS OF UNSTEADINESS: 0
ENDOCRINE NEGATIVE: 1
EYES NEGATIVE: 1
HEMATOLOGIC/LYMPHATIC NEGATIVE: 1
NEUROLOGICAL NEGATIVE: 1
MUSCULOSKELETAL NEGATIVE: 1
CONSTITUTIONAL NEGATIVE: 1
DEPRESSION: 0
PSYCHIATRIC NEGATIVE: 1
CARDIOVASCULAR NEGATIVE: 1
RESPIRATORY NEGATIVE: 1
LOSS OF SENSATION IN FEET: 0

## 2024-05-01 ASSESSMENT — COLUMBIA-SUICIDE SEVERITY RATING SCALE - C-SSRS
6. HAVE YOU EVER DONE ANYTHING, STARTED TO DO ANYTHING, OR PREPARED TO DO ANYTHING TO END YOUR LIFE?: NO
2. HAVE YOU ACTUALLY HAD ANY THOUGHTS OF KILLING YOURSELF?: NO
1. IN THE PAST MONTH, HAVE YOU WISHED YOU WERE DEAD OR WISHED YOU COULD GO TO SLEEP AND NOT WAKE UP?: NO

## 2024-05-01 ASSESSMENT — PAIN SCALES - GENERAL: PAINLEVEL: 0-NO PAIN

## 2024-05-01 ASSESSMENT — PATIENT HEALTH QUESTIONNAIRE - PHQ9
1. LITTLE INTEREST OR PLEASURE IN DOING THINGS: NOT AT ALL
SUM OF ALL RESPONSES TO PHQ9 QUESTIONS 1 AND 2: 0
2. FEELING DOWN, DEPRESSED OR HOPELESS: NOT AT ALL

## 2024-05-01 NOTE — PROGRESS NOTES
[facility] for Integrated and Novel Approaches in Vascular-Metabolic Disease ([facility]) Note    Reason for Visit: Follow-up visit  Referring Clinician: No ref. provider found     History of Present Illness:  Mrs. Duncan is a 53-year-old woman with coronary artery disease status post PCI to the LAD in 2021 (nl LVEF 60% last echo) complicated by poorly controlled type 2 diabetes mellitus, hypertension, hyperlipidemia, and elevated BMI who was referred to the [facility] clinic by her PCP for cardiometabolic risk factor optimization and is here for follow-up visit.  Since her last visit she has not had any major cardiovascular health events or hospitalizations.  Unfortunately she has been out of several of her medications including her long-acting insulin and her tirzepatide.  This is led to hyperglycemia with a time in range of only 6% in the last several weeks her CGM.  Repeat laboratory studies show slight improvement in lipids with the change from atorvastatin to rosuvastatin and continued hypoglycemia.  She does not have albuminuria.  She does have some symptoms of neuropathy in her feet from type 2 diabetes which she would like some ideas for topical solutions.    Past Medical History:  She has a past medical history of Cholecystitis (06/09/2023), Cyst of breast (07/07/2023), Rectal bleeding (06/09/2023), Type 2 diabetes mellitus with circulatory disorder, with long-term current use of insulin ([facility]) (12/28/2023), Type 2 diabetes mellitus with hyperglycemia, with long-term current use of insulin (Multi) (08/09/2011), and Vertigo (07/07/2023).    Past Surgical History:  She has a past surgical history that includes Other surgical history (07/16/2019) and Other surgical history (07/16/2019).    Social History:  She reports that she has never smoked. She has never used smokeless tobacco. She reports current alcohol use. She reports that she does not use drugs.    Family History:  Family History   Problem Relation Name Age of  Onset    Hypertension Father      Prostate cancer Father      Other (bladder cancer) Maternal Grandmother      Breast cancer Maternal Grandmother         Allergies:  Oxycodone-acetaminophen    Outpatient Medications:  Current Outpatient Medications   Medication Instructions    aspirin 81 mg EC tablet 1 tablet, oral, Daily    blood-glucose sensor device USE 1 SENSOR EVERY 14 DAYS    cholecalciferol (Vitamin D-3) 25 MCG (1000 UT) tablet 1 tablet, oral, Daily    docusate sodium (Colace) 100 mg capsule oral, Daily PRN    empagliflozin (Jardiance) 10 mg TAKE 1 TABLET BY MOUTH ONCE DAILY    ezetimibe (ZETIA) 10 mg, oral, Daily    HumaLOG KwikPen Insulin 100 unit/mL injection INJECT 20 UNITS UNDER THE SKIN 3 TIMES A DAY WITH MEALS. ADJUST PER SLIDING SCALE, NO MORE THAN 100 UNITS DAILY    hydrocortisone acetate 2.5 % cream with perineal applicator Daily RT    icosapent ethyL (VASCEPA) 2 g, oral, 2 times daily with meals    icosapent ethyL (VASCEPA) 2 g, oral, 2 times daily with meals    isosorbide mononitrate ER (Imdur) 30 mg 24 hr tablet 1 tablet, oral, Daily    Lantus Solostar U-100 Insulin 45 Units, subcutaneous, Nightly, Take as directed per insulin instructions.    levothyroxine (SYNTHROID, LEVOXYL) 50 mcg, oral, Daily    levothyroxine (SYNTHROID, LEVOXYL) 200 mcg, oral, Daily    multivitamin-min-iron-FA-vit K (Multi-Day Plus Minerals) 18 mg iron-400 mcg-25 mcg tablet 1 tablet, oral, Daily    nitroglycerin (Nitrostat) 0.4 mg SL tablet sublingual    [START ON 5/5/2024] Ozempic 0.25 mg, subcutaneous, Once Weekly    pantoprazole (ProtoNix) 40 mg EC tablet oral, 2 times daily    rosuvastatin (CRESTOR) 40 mg, oral, Daily    valACYclovir (Valtrex) 500 mg tablet        Review of Systems:  Review of Systems   Constitutional: Negative.   HENT: Negative.     Eyes: Negative.    Cardiovascular: Negative.    Respiratory: Negative.     Endocrine: Negative.    Hematologic/Lymphatic: Negative.    Skin: Negative.    Musculoskeletal:  "Negative.    Gastrointestinal: Negative.    Genitourinary: Negative.    Neurological: Negative.    Psychiatric/Behavioral: Negative.     Allergic/Immunologic: Negative.        Last Recorded Vitals:  Vitals:    05/01/24 1047   BP: 111/73   BP Location: Left arm   Patient Position: Sitting   BP Cuff Size: Adult   Pulse: 87   SpO2: 96%   Weight: 70.7 kg (155 lb 12.8 oz)   Height: 1.645 m (5' 4.75\")       Physical Examination:  General: Well appearing, well-nourished, in no acute distress.  HEENT: Normocephalic atraumatic, pupils equal and reactive to light, extraocular muscles intact, no conjunctival injection, oropharynx clear without exudates.  Neck: Normal carotid arterial pulses, no arterial bruits, no thyromegaly.  Cardiac: Regular rhythm and normal heart rate.  S1, S2 present and normal.  No murmurs, rubs, or gallops.  PMI is nondisplaced. Jugular venous pulsations are normal.  Pulmonary: Normal breath sounds, no increased work of breathing, no wheezes or crackles.  GI: Normal bowel sounds, abdominal aorta not enlarged, no hepatosplenomegaly, no abdominal bruits.  Lower extremities: No cyanosis, clubbing, or edema.  No xanthelasma present. Normal distal pulses.  Skin: Skin intact. No significant rashes or lesions present.  Neuro: Alert and oriented x 3, normal attention and cognition, no focal motor or sensory neurologic deficits.  Psych: Normal affect and mood.  Musculoskeletal: Normal gait normal muscle tone.    Laboratory Studies:  Lab Results   Component Value Date    GLUCOSE 238 (H) 04/30/2024    CALCIUM 9.0 04/30/2024     04/30/2024    K 4.4 04/30/2024    CO2 29 04/30/2024     04/30/2024    BUN 27 (H) 04/30/2024    CREATININE 0.79 04/30/2024     Lab Results   Component Value Date    ALT 19 04/30/2024    AST 15 04/30/2024    ALKPHOS 131 (H) 04/30/2024    BILITOT 0.5 04/30/2024     Results from last 7 days   Lab Units 04/30/24  0828   CHOLESTEROL mg/dL 173   TRIGLYCERIDES mg/dL 171*   HDL mg/dL " "40.5     Lab Results   Component Value Date    CHOL 173 04/30/2024    CHOL 195 12/26/2023    CHOL 136 08/12/2023     Lab Results   Component Value Date    HDL 40.5 04/30/2024    HDL 42.2 12/26/2023    HDL 37.7 (A) 08/12/2023     Lab Results   Component Value Date    LDLCALC 98 04/30/2024    LDLCALC 100 (H) 12/26/2023     Lab Results   Component Value Date    TRIG 171 (H) 04/30/2024    TRIG 263 (H) 12/26/2023    TRIG 153 (H) 08/12/2023     No components found for: \"CHOLHDL\"  Lab Results   Component Value Date    HGBA1C 8.7 (H) 12/26/2023     UACR 9 mg/g    Assessment and Plan:  Problem List Items Addressed This Visit          Cardiac and Vasculature    CAD in native artery - Primary    Hyperlipidemia    Relevant Medications    ezetimibe (Zetia) 10 mg tablet    Other Relevant Orders    Lipid panel    S/P PTCA (percutaneous transluminal coronary angioplasty)    Benign essential hypertension    Hypertriglyceridemia    Relevant Orders    Lipid panel       Endocrine/Metabolic    Type 2 diabetes mellitus with hyperglycemia, with long-term current use of insulin (Multi)    Relevant Medications    semaglutide (Ozempic) 0.25 mg or 0.5 mg(2 mg/1.5 mL) pen injector (Start on 5/5/2024)    insulin glargine (Lantus Solostar U-100 Insulin) 100 unit/mL (3 mL) pen    Other Relevant Orders    Referral to Podiatry    Hemoglobin A1C    Lipid panel    Referral to Clinical Pharmacy     Other Visit Diagnoses       Type 2 diabetes mellitus with other circulatory complication, without long-term current use of insulin (Multi)        Relevant Medications    insulin glargine (Lantus Solostar U-100 Insulin) 100 unit/mL (3 mL) pen          Mrs. Duncan is a 53-year-old woman with coronary artery disease status post PCI to the LAD in 2021 (nl LVEF 60% last echo) complicated by poorly controlled type 2 diabetes mellitus, hypertension, hyperlipidemia, and elevated BMI who was [facility] by her PCP for cardiometabolic risk factor " optimization and is here for follow-up visit.  She requires much better glycemic control so we have renewed her long-acting insulin glargine and switched her over from tirzepatide to semaglutide given that it is more available on the market.  Will reconnect her with our clinical pharmacist for dose escalation as well to improve her glycemic control.  We also referred her to podiatry and our nurse will discuss topical treatments for her neuropathy.  Her LDL cholesterol remains above goal so we have added ezetimibe to her high intensity statin today.  I will see her again in 3 months here in the office with repeat laboratory studies at that time.  Please do not hesitate to call or contact me with questions or concerns.    I spent 45 minutes of face-to-face time with the patient, with more than 50% of that time spent in counseling and/or coordination of care.    Minh Galvin MD, FAJAKUB, FACC  Director,  Center for Cardiovascular Prevention  Director,  [facility] Program  Associate Professor of Medicine  [employer]

## 2024-05-02 ENCOUNTER — PHARMACY VISIT (OUTPATIENT)
Dept: PHARMACY | Facility: CLINIC | Age: 54
End: 2024-05-02
Payer: COMMERCIAL

## 2024-05-02 ENCOUNTER — TELEPHONE (OUTPATIENT)
Dept: CARDIOLOGY | Facility: HOSPITAL | Age: 54
End: 2024-05-02
Payer: COMMERCIAL

## 2024-05-02 DIAGNOSIS — Z79.4 TYPE 2 DIABETES MELLITUS WITH HYPERGLYCEMIA, WITH LONG-TERM CURRENT USE OF INSULIN (MULTI): Primary | ICD-10-CM

## 2024-05-02 DIAGNOSIS — E11.65 TYPE 2 DIABETES MELLITUS WITH HYPERGLYCEMIA, WITH LONG-TERM CURRENT USE OF INSULIN (MULTI): Primary | ICD-10-CM

## 2024-05-02 DIAGNOSIS — E11.59 TYPE 2 DIABETES MELLITUS WITH OTHER CIRCULATORY COMPLICATION, WITHOUT LONG-TERM CURRENT USE OF INSULIN (MULTI): ICD-10-CM

## 2024-05-02 LAB
EST. AVERAGE GLUCOSE BLD GHB EST-MCNC: 192 MG/DL
HBA1C MFR BLD: 8.3 %

## 2024-05-02 RX ORDER — INSULIN GLARGINE 100 [IU]/ML
45 INJECTION, SOLUTION SUBCUTANEOUS NIGHTLY
Qty: 35 ML | Refills: 3 | Status: SHIPPED | OUTPATIENT
Start: 2024-05-02 | End: 2025-05-02

## 2024-06-22 ENCOUNTER — LAB (OUTPATIENT)
Dept: LAB | Facility: LAB | Age: 54
End: 2024-06-22
Payer: COMMERCIAL

## 2024-06-22 DIAGNOSIS — E78.2 MIXED HYPERLIPIDEMIA: ICD-10-CM

## 2024-06-22 DIAGNOSIS — E03.9 HYPOTHYROIDISM, UNSPECIFIED TYPE: ICD-10-CM

## 2024-06-22 DIAGNOSIS — Z79.4 TYPE 2 DIABETES MELLITUS WITH HYPERGLYCEMIA, WITH LONG-TERM CURRENT USE OF INSULIN (MULTI): ICD-10-CM

## 2024-06-22 DIAGNOSIS — E11.65 TYPE 2 DIABETES MELLITUS WITH HYPERGLYCEMIA, WITH LONG-TERM CURRENT USE OF INSULIN (MULTI): ICD-10-CM

## 2024-06-22 LAB
CHOLEST SERPL-MCNC: 119 MG/DL (ref 0–199)
CHOLESTEROL/HDL RATIO: 3
HDLC SERPL-MCNC: 40.3 MG/DL
LDLC SERPL CALC-MCNC: 55 MG/DL
NON HDL CHOLESTEROL: 79 MG/DL (ref 0–149)
T4 FREE SERPL-MCNC: 0.49 NG/DL (ref 0.61–1.12)
TRIGL SERPL-MCNC: 120 MG/DL (ref 0–149)
TSH SERPL-ACNC: 21.82 MIU/L (ref 0.44–3.98)
VLDL: 24 MG/DL (ref 0–40)

## 2024-06-22 PROCEDURE — 84443 ASSAY THYROID STIM HORMONE: CPT

## 2024-06-22 PROCEDURE — 84439 ASSAY OF FREE THYROXINE: CPT

## 2024-06-22 PROCEDURE — 80061 LIPID PANEL: CPT

## 2024-06-22 PROCEDURE — 36415 COLL VENOUS BLD VENIPUNCTURE: CPT

## 2024-06-26 ENCOUNTER — PATIENT MESSAGE (OUTPATIENT)
Dept: PRIMARY CARE | Facility: CLINIC | Age: 54
End: 2024-06-26

## 2024-06-26 ENCOUNTER — APPOINTMENT (OUTPATIENT)
Dept: PRIMARY CARE | Facility: CLINIC | Age: 54
End: 2024-06-26
Payer: COMMERCIAL

## 2024-06-26 VITALS
WEIGHT: 154.4 LBS | DIASTOLIC BLOOD PRESSURE: 70 MMHG | BODY MASS INDEX: 25.72 KG/M2 | OXYGEN SATURATION: 98 % | RESPIRATION RATE: 16 BRPM | TEMPERATURE: 97.2 F | HEIGHT: 65 IN | HEART RATE: 91 BPM | SYSTOLIC BLOOD PRESSURE: 105 MMHG

## 2024-06-26 DIAGNOSIS — E11.65 TYPE 2 DIABETES MELLITUS WITH HYPERGLYCEMIA, WITH LONG-TERM CURRENT USE OF INSULIN (MULTI): ICD-10-CM

## 2024-06-26 DIAGNOSIS — Z79.4 TYPE 2 DIABETES MELLITUS WITH OTHER CIRCULATORY COMPLICATION, WITH LONG-TERM CURRENT USE OF INSULIN (MULTI): ICD-10-CM

## 2024-06-26 DIAGNOSIS — E11.59 TYPE 2 DIABETES MELLITUS WITH OTHER CIRCULATORY COMPLICATION, WITH LONG-TERM CURRENT USE OF INSULIN (MULTI): ICD-10-CM

## 2024-06-26 DIAGNOSIS — E03.9 HYPOTHYROIDISM, UNSPECIFIED TYPE: ICD-10-CM

## 2024-06-26 DIAGNOSIS — E78.2 MIXED HYPERLIPIDEMIA: ICD-10-CM

## 2024-06-26 DIAGNOSIS — I25.10 CAD IN NATIVE ARTERY: Primary | ICD-10-CM

## 2024-06-26 DIAGNOSIS — Z79.4 TYPE 2 DIABETES MELLITUS WITH HYPERGLYCEMIA, WITH LONG-TERM CURRENT USE OF INSULIN (MULTI): ICD-10-CM

## 2024-06-26 PROCEDURE — 3061F NEG MICROALBUMINURIA REV: CPT | Performed by: FAMILY MEDICINE

## 2024-06-26 PROCEDURE — 3078F DIAST BP <80 MM HG: CPT | Performed by: FAMILY MEDICINE

## 2024-06-26 PROCEDURE — 99214 OFFICE O/P EST MOD 30 MIN: CPT | Performed by: FAMILY MEDICINE

## 2024-06-26 PROCEDURE — 1036F TOBACCO NON-USER: CPT | Performed by: FAMILY MEDICINE

## 2024-06-26 PROCEDURE — 3052F HG A1C>EQUAL 8.0%<EQUAL 9.0%: CPT | Performed by: FAMILY MEDICINE

## 2024-06-26 PROCEDURE — 3048F LDL-C <100 MG/DL: CPT | Performed by: FAMILY MEDICINE

## 2024-06-26 PROCEDURE — 3074F SYST BP LT 130 MM HG: CPT | Performed by: FAMILY MEDICINE

## 2024-06-26 RX ORDER — LEVOTHYROXINE SODIUM 200 UG/1
200 TABLET ORAL DAILY
Qty: 90 TABLET | Refills: 3 | Status: SHIPPED | OUTPATIENT
Start: 2024-06-26 | End: 2025-06-26

## 2024-06-26 RX ORDER — LEVOTHYROXINE SODIUM 100 UG/1
100 TABLET ORAL DAILY
Qty: 90 TABLET | Refills: 3 | Status: SHIPPED | OUTPATIENT
Start: 2024-06-26 | End: 2025-06-26

## 2024-06-26 RX ORDER — TIRZEPATIDE 7.5 MG/.5ML
7.5 INJECTION, SOLUTION SUBCUTANEOUS
Qty: 6.5 ML | Refills: 1 | Status: SHIPPED | OUTPATIENT
Start: 2024-06-30 | End: 2024-06-27 | Stop reason: SDUPTHER

## 2024-06-26 ASSESSMENT — ENCOUNTER SYMPTOMS
PALPITATIONS: 0
DIARRHEA: 0
SHORTNESS OF BREATH: 0
POLYDIPSIA: 0
NAUSEA: 0
POLYPHAGIA: 0
APPETITE CHANGE: 0
TROUBLE SWALLOWING: 0
DIZZINESS: 0
FATIGUE: 0
UNEXPECTED WEIGHT CHANGE: 0
MYALGIAS: 0
HEADACHES: 0

## 2024-06-26 NOTE — ASSESSMENT & PLAN NOTE
Given short term rx of Mounjaro 7.5 til gets into [facility] clinic next month. IF not tolerating it, will need to start lower.  Will get records from [facility] in [place]. Follow up at [facility].

## 2024-06-26 NOTE — ASSESSMENT & PLAN NOTE
Seeing Cardiology at [facility]. Added statin, zetia and Icosapent and lipid normal. They are managing all cardiovascular meds

## 2024-06-26 NOTE — ASSESSMENT & PLAN NOTE
At goal. On Rosuvastatin, Zetia and Vascepa which she tolerates well. Has appt at [facility]

## 2024-06-26 NOTE — PROGRESS NOTES
Subjective   Patient ID: Cleopatra Duncan is a 53 y.o. female who presents for Follow-up (6 month follow up on Thyroid and labs).    Here for 6 month follow up.     Is being seen by [facility] clinic. Cardiology added Rosuvastatin, Zetia and Icosapent. She is tolerating ok. Had labs done.   Was unable to get Mounjaro so they switched her to Ozempic starter pack. She would like to go back on Mounjaro, [facility] follow up is next month. Sugars are in green until eats on her dexcom. Not counting carbs. On Dexcom. Sugars not as good as on Mounjaro. On Isosorbide for CAD. As mounjaro is more available, would like temporary rx to go to [facility]. Wants to go up to 7.5 mg.     Eye exam done in March in [place]. Told her no retinopathy.     Hypothyroid - 250 mcg daily. Taking it alone. She had labs done. Dry skin, hair thinning.                Current Outpatient Medications:     aspirin 81 mg EC tablet, Take 1 tablet (81 mg) by mouth once daily., Disp: , Rfl:     blood-glucose sensor device, USE 1 SENSOR EVERY 14 DAYS, Disp: 2 each, Rfl: 11    cholecalciferol (Vitamin D-3) 25 MCG (1000 UT) tablet, Take 1 tablet (25 mcg) by mouth once daily., Disp: , Rfl:     docusate sodium (Colace) 100 mg capsule, Take by mouth once daily as needed., Disp: , Rfl:     empagliflozin (Jardiance) 10 mg, TAKE 1 TABLET BY MOUTH ONCE DAILY, Disp: 90 tablet, Rfl: 3    ezetimibe (Zetia) 10 mg tablet, Take 1 tablet (10 mg) by mouth once daily., Disp: 90 tablet, Rfl: 3    HumaLOG KwikPen Insulin 100 unit/mL injection, INJECT 20 UNITS UNDER THE SKIN 3 TIMES A DAY WITH MEALS. ADJUST PER SLIDING SCALE, NO MORE THAN 100 UNITS DAILY, Disp: 60 mL, Rfl: 1    hydrocortisone acetate 2.5 % cream with perineal applicator, once daily., Disp: , Rfl:     icosapent ethyL (Vascepa) 1 gram capsule, Take 2 capsules (2 g) by mouth 2 times a day with meals., Disp: 120 capsule, Rfl: 11    icosapent ethyL (Vascepa) 1 gram capsule, Take 2 capsules (2 g) by mouth 2 times a day  with meals., Disp: 360 capsule, Rfl: 3    insulin glargine (Lantus Solostar U-100 Insulin) 100 unit/mL (3 mL) pen, Inject 45 Units under the skin once daily at bedtime. Take as directed per insulin instructions., Disp: 35 mL, Rfl: 3    isosorbide mononitrate ER (Imdur) 30 mg 24 hr tablet, Take 1 tablet (30 mg) by mouth once daily., Disp: , Rfl:     multivitamin-min-iron-FA-vit K (Multi-Day Plus Minerals) 18 mg iron-400 mcg-25 mcg tablet, Take 1 tablet by mouth once daily., Disp: , Rfl:     pantoprazole (ProtoNix) 40 mg EC tablet, Take by mouth twice a day., Disp: , Rfl:     rosuvastatin (Crestor) 40 mg tablet, Take 1 tablet (40 mg) by mouth once daily., Disp: 90 tablet, Rfl: 3    valACYclovir (Valtrex) 500 mg tablet, , Disp: , Rfl:     levothyroxine (Synthroid, Levoxyl) 100 mcg tablet, Take 1 tablet (100 mcg) by mouth early in the morning.. Take on an empty stomach at the same time each day, either 30 to 60 minutes prior to breakfast, Disp: 90 tablet, Rfl: 3    levothyroxine (Synthroid, Levoxyl) 200 mcg tablet, Take 1 tablet (200 mcg) by mouth once daily., Disp: 90 tablet, Rfl: 3    nitroglycerin (Nitrostat) 0.4 mg SL tablet, Place under the tongue., Disp: , Rfl:     [START ON 6/30/2024] tirzepatide (Mounjaro) 7.5 mg/0.5 mL pen injector, Inject 7.5 mg under the skin 1 (one) time per week., Disp: 6.5 mL, Rfl: 1    Patient Active Problem List   Diagnosis    Alternating constipation and diarrhea    CAD in native artery    Genital herpes in women    GERD (gastroesophageal reflux disease)    Hyperlipidemia    Hypothyroidism    Overweight    S/P PTCA (percutaneous transluminal coronary angioplasty)    Vitamin D deficiency    Type 2 diabetes mellitus with hyperglycemia, with long-term current use of insulin (Multi)    Chronic constipation    Type 2 diabetes mellitus with circulatory disorder, with long-term current use of insulin (Multi)    Benign essential hypertension    Cerebral palsy (Multi)    Chest pain on exertion  "   Dyspnea on exertion    History of metabolic disorder    Pruritus ani    Hypertriglyceridemia         Review of Systems   Constitutional:  Negative for appetite change, fatigue and unexpected weight change.   HENT:  Negative for trouble swallowing.    Respiratory:  Negative for shortness of breath.    Cardiovascular:  Negative for chest pain, palpitations and leg swelling.   Gastrointestinal:  Negative for diarrhea and nausea.   Endocrine: Negative for cold intolerance, heat intolerance, polydipsia, polyphagia and polyuria.   Musculoskeletal:  Negative for myalgias.   Skin:  Negative for rash.   Neurological:  Negative for dizziness and headaches.       Objective   /70 (BP Location: Left arm, Patient Position: Sitting, BP Cuff Size: Adult long)   Pulse 91   Temp 36.2 °C (97.2 °F) (Temporal)   Resp 16   Ht 1.645 m (5' 4.75\")   Wt 70 kg (154 lb 6.4 oz)   SpO2 98%   BMI 25.89 kg/m²     Physical Exam  Vitals reviewed.   Constitutional:       Appearance: Normal appearance.   Pulmonary:      Effort: Pulmonary effort is normal.   Neurological:      Mental Status: She is alert.   Psychiatric:         Mood and Affect: Mood normal.         Behavior: Behavior normal.         Assessment/Plan   Problem List Items Addressed This Visit       CAD in native artery - Primary    Hyperlipidemia     At goal. On Rosuvastatin, Zetia and Vascepa which she tolerates well. Has appt at [facility]         Hypothyroidism     Increased to 300 mcg daily. Recheck lab 6-8 weeks.          Relevant Medications    levothyroxine (Synthroid, Levoxyl) 100 mcg tablet    levothyroxine (Synthroid, Levoxyl) 200 mcg tablet    Other Relevant Orders    Thyroid Stimulating Hormone    Thyroxine, Free    Type 2 diabetes mellitus with hyperglycemia, with long-term current use of insulin (Multi)     Given short term rx of Mounjaro 7.5 til gets into [facility] clinic next month. IF not tolerating it, will need to start lower.  [facility].          Relevant Medications    tirzepatide (Mounjaro) 7.5 mg/0.5 mL pen injector (Start on 6/30/2024)    Type 2 diabetes mellitus with circulatory disorder, with long-term current use of insulin (Multi)     Seeing Cardiology at [facility]. Added statin, zetia and Icosapent and lipid normal. They are managing all cardiovascular meds         Relevant Medications    tirzepatide (Mounjaro) 7.5 mg/0.5 mL pen injector (Start on 6/30/2024)         Assessment, plans, tests, and follow up discussed with patient and patient verbalized understanding. Cleopatra was given an opportunity to ask questions and  any concerns were addressed including but not limited to medication dose change, follow up, lab results. .

## 2024-06-27 NOTE — TELEPHONE ENCOUNTER
From: Cleopatra Duncan  To: Maral Kitchen  Sent: 6/26/2024 9:10 PM EDT  Subject: Mounjaro    Alex Kitchen,    I was contacted by [facility] regarding the prescription for Mounjaro. They do not have any in stock and have a back order.    Could you please resend the prescription to [facility] in [place]?    Thank you,  Cleopatra Amaro

## 2024-06-28 RX ORDER — TIRZEPATIDE 7.5 MG/.5ML
7.5 INJECTION, SOLUTION SUBCUTANEOUS
Qty: 6.5 ML | Refills: 1 | Status: SHIPPED | OUTPATIENT
Start: 2024-06-30

## 2024-07-22 DIAGNOSIS — Z98.61 S/P PTCA (PERCUTANEOUS TRANSLUMINAL CORONARY ANGIOPLASTY): ICD-10-CM

## 2024-07-22 DIAGNOSIS — Z79.4 TYPE 2 DIABETES MELLITUS WITH HYPERGLYCEMIA, WITH LONG-TERM CURRENT USE OF INSULIN (MULTI): Primary | ICD-10-CM

## 2024-07-22 DIAGNOSIS — E78.2 MIXED HYPERLIPIDEMIA: ICD-10-CM

## 2024-07-22 DIAGNOSIS — E11.65 TYPE 2 DIABETES MELLITUS WITH HYPERGLYCEMIA, WITH LONG-TERM CURRENT USE OF INSULIN (MULTI): Primary | ICD-10-CM

## 2024-07-31 ENCOUNTER — APPOINTMENT (OUTPATIENT)
Dept: CARDIOLOGY | Facility: CLINIC | Age: 54
End: 2024-07-31
Payer: COMMERCIAL

## 2024-08-13 ENCOUNTER — TELEPHONE (OUTPATIENT)
Dept: SCHEDULING | Age: 54
End: 2024-08-13

## 2024-09-18 DIAGNOSIS — E78.2 MIXED HYPERLIPIDEMIA: Primary | ICD-10-CM

## 2024-09-23 ENCOUNTER — TELEPHONE (OUTPATIENT)
Dept: CARDIOLOGY | Facility: HOSPITAL | Age: 54
End: 2024-09-23
Payer: COMMERCIAL

## 2024-09-24 ENCOUNTER — LAB (OUTPATIENT)
Dept: LAB | Facility: LAB | Age: 54
End: 2024-09-24
Payer: COMMERCIAL

## 2024-09-24 DIAGNOSIS — E03.9 HYPOTHYROIDISM, UNSPECIFIED TYPE: ICD-10-CM

## 2024-09-24 DIAGNOSIS — Z79.4 TYPE 2 DIABETES MELLITUS WITH HYPERGLYCEMIA, WITH LONG-TERM CURRENT USE OF INSULIN: ICD-10-CM

## 2024-09-24 DIAGNOSIS — E11.65 TYPE 2 DIABETES MELLITUS WITH HYPERGLYCEMIA, WITH LONG-TERM CURRENT USE OF INSULIN: ICD-10-CM

## 2024-09-24 DIAGNOSIS — E78.1 HYPERTRIGLYCERIDEMIA: ICD-10-CM

## 2024-09-24 DIAGNOSIS — E78.2 MIXED HYPERLIPIDEMIA: ICD-10-CM

## 2024-09-24 LAB
ALBUMIN SERPL BCP-MCNC: 3.9 G/DL (ref 3.4–5)
ALP SERPL-CCNC: 106 U/L (ref 33–110)
ALT SERPL W P-5'-P-CCNC: 14 U/L (ref 7–45)
ANION GAP SERPL CALC-SCNC: 12 MMOL/L (ref 10–20)
AST SERPL W P-5'-P-CCNC: 13 U/L (ref 9–39)
BASOPHILS # BLD AUTO: 0.07 X10*3/UL (ref 0–0.1)
BASOPHILS NFR BLD AUTO: 0.7 %
BILIRUB SERPL-MCNC: 0.6 MG/DL (ref 0–1.2)
BUN SERPL-MCNC: 15 MG/DL (ref 6–23)
CALCIUM SERPL-MCNC: 9 MG/DL (ref 8.6–10.3)
CHLORIDE SERPL-SCNC: 106 MMOL/L (ref 98–107)
CHOLEST SERPL-MCNC: 122 MG/DL (ref 0–199)
CHOLESTEROL/HDL RATIO: 3.3
CO2 SERPL-SCNC: 28 MMOL/L (ref 21–32)
CREAT SERPL-MCNC: 0.63 MG/DL (ref 0.5–1.05)
EGFRCR SERPLBLD CKD-EPI 2021: >90 ML/MIN/1.73M*2
EOSINOPHIL # BLD AUTO: 0.23 X10*3/UL (ref 0–0.7)
EOSINOPHIL NFR BLD AUTO: 2.3 %
ERYTHROCYTE [DISTWIDTH] IN BLOOD BY AUTOMATED COUNT: 13.2 % (ref 11.5–14.5)
EST. AVERAGE GLUCOSE BLD GHB EST-MCNC: 151 MG/DL
GLUCOSE SERPL-MCNC: 104 MG/DL (ref 74–99)
HBA1C MFR BLD: 6.9 %
HCT VFR BLD AUTO: 42.2 % (ref 36–46)
HDLC SERPL-MCNC: 36.8 MG/DL
HGB BLD-MCNC: 14.5 G/DL (ref 12–16)
IMM GRANULOCYTES # BLD AUTO: 0.03 X10*3/UL (ref 0–0.7)
IMM GRANULOCYTES NFR BLD AUTO: 0.3 % (ref 0–0.9)
LDLC SERPL CALC-MCNC: 56 MG/DL
LYMPHOCYTES # BLD AUTO: 3.1 X10*3/UL (ref 1.2–4.8)
LYMPHOCYTES NFR BLD AUTO: 30.5 %
MCH RBC QN AUTO: 28.6 PG (ref 26–34)
MCHC RBC AUTO-ENTMCNC: 34.4 G/DL (ref 32–36)
MCV RBC AUTO: 83 FL (ref 80–100)
MONOCYTES # BLD AUTO: 0.73 X10*3/UL (ref 0.1–1)
MONOCYTES NFR BLD AUTO: 7.2 %
NEUTROPHILS # BLD AUTO: 6.02 X10*3/UL (ref 1.2–7.7)
NEUTROPHILS NFR BLD AUTO: 59 %
NON HDL CHOLESTEROL: 85 MG/DL (ref 0–149)
NRBC BLD-RTO: 0 /100 WBCS (ref 0–0)
PLATELET # BLD AUTO: 270 X10*3/UL (ref 150–450)
POTASSIUM SERPL-SCNC: 4.1 MMOL/L (ref 3.5–5.3)
PROT SERPL-MCNC: 6.6 G/DL (ref 6.4–8.2)
RBC # BLD AUTO: 5.07 X10*6/UL (ref 4–5.2)
SODIUM SERPL-SCNC: 142 MMOL/L (ref 136–145)
T4 FREE SERPL-MCNC: 1.75 NG/DL (ref 0.61–1.12)
TRIGL SERPL-MCNC: 146 MG/DL (ref 0–149)
TSH SERPL-ACNC: 0.08 MIU/L (ref 0.44–3.98)
VLDL: 29 MG/DL (ref 0–40)
WBC # BLD AUTO: 10.2 X10*3/UL (ref 4.4–11.3)

## 2024-09-24 PROCEDURE — 83036 HEMOGLOBIN GLYCOSYLATED A1C: CPT

## 2024-09-24 PROCEDURE — 36415 COLL VENOUS BLD VENIPUNCTURE: CPT

## 2024-09-24 PROCEDURE — 84439 ASSAY OF FREE THYROXINE: CPT

## 2024-09-24 PROCEDURE — 80061 LIPID PANEL: CPT

## 2024-09-24 PROCEDURE — 80053 COMPREHEN METABOLIC PANEL: CPT

## 2024-09-24 PROCEDURE — 85025 COMPLETE CBC W/AUTO DIFF WBC: CPT

## 2024-09-24 PROCEDURE — 84443 ASSAY THYROID STIM HORMONE: CPT

## 2024-09-27 ENCOUNTER — TELEMEDICINE (OUTPATIENT)
Dept: CARDIOLOGY | Facility: CLINIC | Age: 54
End: 2024-09-27
Payer: COMMERCIAL

## 2024-09-27 ENCOUNTER — APPOINTMENT (OUTPATIENT)
Dept: PRIMARY CARE | Facility: CLINIC | Age: 54
End: 2024-09-27
Payer: COMMERCIAL

## 2024-09-27 DIAGNOSIS — E78.1 HYPERTRIGLYCERIDEMIA: ICD-10-CM

## 2024-09-27 DIAGNOSIS — Z79.4 TYPE 2 DIABETES MELLITUS WITH HYPERGLYCEMIA, WITH LONG-TERM CURRENT USE OF INSULIN: Primary | ICD-10-CM

## 2024-09-27 DIAGNOSIS — E78.2 MIXED HYPERLIPIDEMIA: ICD-10-CM

## 2024-09-27 DIAGNOSIS — I10 BENIGN ESSENTIAL HYPERTENSION: ICD-10-CM

## 2024-09-27 DIAGNOSIS — E11.65 TYPE 2 DIABETES MELLITUS WITH HYPERGLYCEMIA, WITH LONG-TERM CURRENT USE OF INSULIN: Primary | ICD-10-CM

## 2024-09-27 DIAGNOSIS — I25.10 CAD IN NATIVE ARTERY: ICD-10-CM

## 2024-09-27 DIAGNOSIS — Z98.61 S/P PTCA (PERCUTANEOUS TRANSLUMINAL CORONARY ANGIOPLASTY): ICD-10-CM

## 2024-09-27 PROCEDURE — 3061F NEG MICROALBUMINURIA REV: CPT | Performed by: INTERNAL MEDICINE

## 2024-09-27 PROCEDURE — 3048F LDL-C <100 MG/DL: CPT | Performed by: INTERNAL MEDICINE

## 2024-09-27 PROCEDURE — 99214 OFFICE O/P EST MOD 30 MIN: CPT | Performed by: INTERNAL MEDICINE

## 2024-09-27 PROCEDURE — 1036F TOBACCO NON-USER: CPT | Performed by: INTERNAL MEDICINE

## 2024-09-27 PROCEDURE — 3044F HG A1C LEVEL LT 7.0%: CPT | Performed by: INTERNAL MEDICINE

## 2024-09-27 RX ORDER — BLOOD-GLUCOSE SENSOR
EACH MISCELLANEOUS
Qty: 2 EACH | Refills: 3 | Status: SHIPPED | OUTPATIENT
Start: 2024-09-27

## 2024-09-27 ASSESSMENT — ENCOUNTER SYMPTOMS
EYES NEGATIVE: 1
ALLERGIC/IMMUNOLOGIC NEGATIVE: 1
GASTROINTESTINAL NEGATIVE: 1
RESPIRATORY NEGATIVE: 1
CARDIOVASCULAR NEGATIVE: 1
HEMATOLOGIC/LYMPHATIC NEGATIVE: 1
CONSTITUTIONAL NEGATIVE: 1
PSYCHIATRIC NEGATIVE: 1
NEUROLOGICAL NEGATIVE: 1
MUSCULOSKELETAL NEGATIVE: 1
ENDOCRINE NEGATIVE: 1

## 2024-09-27 NOTE — PROGRESS NOTES
[facility]-Metabolic [facility] ([facility]) Note    Reason for Visit: Follow up visit  Referring Clinician: No ref. provider found     History of Present Illness: Mrs. Duncan is a 53-year-old woman with coronary artery disease status post PCI to the LAD in 2021 (nl LVEF 60% last echo) complicated by poorly controlled type 2 diabetes mellitus, hypertension, hyperlipidemia, and elevated BMI who was referred to the [facility] clinic by her PCP for cardiometabolic risk factor optimization and is here for follow-up visit. Since her last visit she has been doing well without any intercurrent health events or hospitalizations. She does not report any cardiopulmonary symptoms. She is adherent to her medications without adverse effects. She is taking 7.5 of tirzepatide and her other CV medications. Repeat A1c is much improved at 6.9% and her CGM shows 87% time in range with no hypoglycemia, avg glucose 145 mg/dl and GMI 6.8% (consistent with her A1c).  She has also lost a bit of weight, approx 5-7 lbs.     Past Medical History:  She has a past medical history of Cholecystitis (06/09/2023), Cyst of breast (07/07/2023), Rectal bleeding (06/09/2023), Type 2 diabetes mellitus with circulatory disorder, with long-term current use of insulin (Multi) (12/28/2023), Type 2 diabetes mellitus with hyperglycemia, with long-term current use of insulin (Multi) (08/09/2011), and Vertigo (07/07/2023).    Past Surgical History:  She has a past surgical history that includes Other surgical history (07/16/2019) and Other surgical history (07/16/2019).    Social History:  She reports that she has never smoked. She has never been exposed to tobacco smoke. She has never used smokeless tobacco. She reports current alcohol use of about 1.0 standard drink of alcohol per week. She reports that she does not use drugs.    Family History:  Family History   Problem Relation Name Age of Onset    Hypertension Father       Prostate cancer Father      Other (bladder cancer) Maternal Grandmother      Breast cancer Maternal Grandmother         Allergies:  Oxycodone-acetaminophen    Outpatient Medications:  Current Outpatient Medications   Medication Instructions    aspirin 81 mg EC tablet 1 tablet, oral, Daily    cholecalciferol (Vitamin D-3) 25 MCG (1000 UT) tablet 1 tablet, oral, Daily    docusate sodium (Colace) 100 mg capsule oral, Daily PRN    empagliflozin (Jardiance) 10 mg TAKE 1 TABLET BY MOUTH ONCE DAILY    ezetimibe (ZETIA) 10 mg, oral, Daily    FreeStyle Roberto 3 Sensor device Use as directed every 14 days.    HumaLOG KwikPen Insulin 100 unit/mL injection INJECT 20 UNITS UNDER THE SKIN 3 TIMES A DAY WITH MEALS. ADJUST PER SLIDING SCALE, NO MORE THAN 100 UNITS DAILY    hydrocortisone acetate 2.5 % cream with perineal applicator Daily RT    icosapent ethyL (VASCEPA) 2 g, oral, 2 times daily (morning and late afternoon)    icosapent ethyL (VASCEPA) 2 g, oral, 2 times daily (morning and late afternoon)    isosorbide mononitrate ER (Imdur) 30 mg 24 hr tablet 1 tablet, oral, Daily    Lantus Solostar U-100 Insulin 45 Units, subcutaneous, Nightly, Take as directed per insulin instructions.    levothyroxine (SYNTHROID, LEVOXYL) 100 mcg, oral, Daily, Take on an empty stomach at the same time each day, either 30 to 60 minutes prior to breakfast    levothyroxine (SYNTHROID, LEVOXYL) 200 mcg, oral, Daily    Mounjaro 7.5 mg, subcutaneous, Once Weekly    multivitamin-min-iron-FA-vit K (Multi-Day Plus Minerals) 18 mg iron-400 mcg-25 mcg tablet 1 tablet, oral, Daily    nitroglycerin (Nitrostat) 0.4 mg SL tablet sublingual    pantoprazole (ProtoNix) 40 mg EC tablet oral, 2 times daily    rosuvastatin (CRESTOR) 40 mg, oral, Daily    valACYclovir (Valtrex) 500 mg tablet        Review of Systems:  Review of Systems   Constitutional: Negative.   HENT: Negative.     Eyes: Negative.    Cardiovascular: Negative.    Respiratory: Negative.     Endocrine:  "Negative.    Hematologic/Lymphatic: Negative.    Skin: Negative.    Musculoskeletal: Negative.    Gastrointestinal: Negative.    Genitourinary: Negative.    Neurological: Negative.    Psychiatric/Behavioral: Negative.     Allergic/Immunologic: Negative.        Last Recorded Vitals:  No vital signs were taken for this telehealth/virtual visit.      Physical Examination:  No comprehensive physical exam was performed due to virtual/telehealth visit.      Laboratory Studies:  Lab Results   Component Value Date    GLUCOSE 104 (H) 09/24/2024    CALCIUM 9.0 09/24/2024     09/24/2024    K 4.1 09/24/2024    CO2 28 09/24/2024     09/24/2024    BUN 15 09/24/2024    CREATININE 0.63 09/24/2024     Lab Results   Component Value Date    ALT 14 09/24/2024    AST 13 09/24/2024    ALKPHOS 106 09/24/2024    BILITOT 0.6 09/24/2024     Results from last 7 days   Lab Units 09/24/24  0750   CHOLESTEROL mg/dL 122   TRIGLYCERIDES mg/dL 146   HDL mg/dL 36.8     Lab Results   Component Value Date    CHOL 122 09/24/2024    CHOL 119 06/22/2024    CHOL 173 04/30/2024     Lab Results   Component Value Date    HDL 36.8 09/24/2024    HDL 40.3 06/22/2024    HDL 40.5 04/30/2024     Lab Results   Component Value Date    LDLCALC 56 09/24/2024    LDLCALC 55 06/22/2024    LDLCALC 98 04/30/2024     Lab Results   Component Value Date    TRIG 146 09/24/2024    TRIG 120 06/22/2024    TRIG 171 (H) 04/30/2024     No components found for: \"CHOLHDL\"  Lab Results   Component Value Date    HGBA1C 6.9 (H) 09/24/2024     FIB-4 Calculation: 0.69 at 9/24/2024  7:50 AM  Calculated from:  SGOT/AST: 13 U/L at 9/24/2024  7:50 AM  SGPT/ALT: 14 U/L at 9/24/2024  7:50 AM  Platelets: 270 x10*3/uL at 9/24/2024  7:50 AM  Age: 53 years    Assessment and Plan:  Problem List Items Addressed This Visit          Cardiac and Vasculature    CAD in native artery    Hyperlipidemia    S/P PTCA (percutaneous transluminal coronary angioplasty)    Benign essential hypertension "    Hypertriglyceridemia       Endocrine/Metabolic    Type 2 diabetes mellitus with hyperglycemia, with long-term current use of insulin (Multi) - Primary    Relevant Medications    FreeStyle Roberto 3 Sensor device    empagliflozin (Jardiance) 10 mg     Mrs. Duncan is a 53-year-old woman with coronary artery disease status post PCI to the LAD in 2021 (nl LVEF 60% last echo) complicated by poorly controlled type 2 diabetes mellitus, hypertension, hyperlipidemia, and elevated BMI who was referred to [facility] by her PCP for cardiometabolic risk factor optimization and is here for follow-up visit. She is asymptomatic from a cardiovascular standpoint and is on appropriate secondary prevention medical therapy for ASCVD. Her risk factor levels are well controlled and she is on guideline directed medical therapy. I refilled her SGLT2i and her Roberto sensor today per her request. I did not make any other medical regimen changes today. I will see her for a routine follow up visit in 6 months.     I spent 35 minutes of face-to-face time with the patient, with more than 50% of that time spent in counseling and/or coordination of care.    Minh Galvin MD, FAJAKUB, FACC  Director,  [facility] Cardiovascular Prevention  Director,  [facility] Program  Associate Professor of Medicine  [employer]

## 2024-10-25 ENCOUNTER — APPOINTMENT (OUTPATIENT)
Dept: CARDIOLOGY | Facility: HOSPITAL | Age: 54
End: 2024-10-25
Payer: COMMERCIAL

## 2024-11-01 ENCOUNTER — OFFICE VISIT (OUTPATIENT)
Dept: CARDIOLOGY | Facility: HOSPITAL | Age: 54
End: 2024-11-01
Payer: COMMERCIAL

## 2024-11-01 ENCOUNTER — APPOINTMENT (OUTPATIENT)
Dept: PRIMARY CARE | Facility: CLINIC | Age: 54
End: 2024-11-01
Payer: COMMERCIAL

## 2024-11-01 VITALS — SYSTOLIC BLOOD PRESSURE: 104 MMHG | HEART RATE: 72 BPM | DIASTOLIC BLOOD PRESSURE: 67 MMHG

## 2024-11-01 VITALS
WEIGHT: 145 LBS | HEART RATE: 95 BPM | HEIGHT: 64 IN | SYSTOLIC BLOOD PRESSURE: 96 MMHG | DIASTOLIC BLOOD PRESSURE: 70 MMHG | BODY MASS INDEX: 24.75 KG/M2

## 2024-11-01 DIAGNOSIS — Z12.31 ENCOUNTER FOR SCREENING MAMMOGRAM FOR MALIGNANT NEOPLASM OF BREAST: Primary | ICD-10-CM

## 2024-11-01 DIAGNOSIS — I25.10 CAD IN NATIVE ARTERY: Primary | ICD-10-CM

## 2024-11-01 DIAGNOSIS — E03.9 HYPOTHYROIDISM, UNSPECIFIED TYPE: ICD-10-CM

## 2024-11-01 PROBLEM — G80.9 CEREBRAL PALSY: Status: RESOLVED | Noted: 2024-01-15 | Resolved: 2024-11-01

## 2024-11-01 PROCEDURE — 3061F NEG MICROALBUMINURIA REV: CPT | Performed by: INTERNAL MEDICINE

## 2024-11-01 PROCEDURE — 3074F SYST BP LT 130 MM HG: CPT | Performed by: INTERNAL MEDICINE

## 2024-11-01 PROCEDURE — 1036F TOBACCO NON-USER: CPT | Performed by: INTERNAL MEDICINE

## 2024-11-01 PROCEDURE — 3048F LDL-C <100 MG/DL: CPT | Performed by: INTERNAL MEDICINE

## 2024-11-01 PROCEDURE — 3074F SYST BP LT 130 MM HG: CPT | Performed by: FAMILY MEDICINE

## 2024-11-01 PROCEDURE — 99213 OFFICE O/P EST LOW 20 MIN: CPT | Performed by: INTERNAL MEDICINE

## 2024-11-01 PROCEDURE — 3008F BODY MASS INDEX DOCD: CPT | Performed by: INTERNAL MEDICINE

## 2024-11-01 PROCEDURE — 3078F DIAST BP <80 MM HG: CPT | Performed by: FAMILY MEDICINE

## 2024-11-01 PROCEDURE — 3061F NEG MICROALBUMINURIA REV: CPT | Performed by: FAMILY MEDICINE

## 2024-11-01 PROCEDURE — 3044F HG A1C LEVEL LT 7.0%: CPT | Performed by: FAMILY MEDICINE

## 2024-11-01 PROCEDURE — 3048F LDL-C <100 MG/DL: CPT | Performed by: FAMILY MEDICINE

## 2024-11-01 PROCEDURE — 3044F HG A1C LEVEL LT 7.0%: CPT | Performed by: INTERNAL MEDICINE

## 2024-11-01 PROCEDURE — 93005 ELECTROCARDIOGRAM TRACING: CPT | Performed by: INTERNAL MEDICINE

## 2024-11-01 PROCEDURE — 99213 OFFICE O/P EST LOW 20 MIN: CPT | Performed by: FAMILY MEDICINE

## 2024-11-01 PROCEDURE — 3078F DIAST BP <80 MM HG: CPT | Performed by: INTERNAL MEDICINE

## 2024-11-01 PROCEDURE — 93010 ELECTROCARDIOGRAM REPORT: CPT | Performed by: INTERNAL MEDICINE

## 2024-11-01 ASSESSMENT — ENCOUNTER SYMPTOMS
LOSS OF SENSATION IN FEET: 0
OCCASIONAL FEELINGS OF UNSTEADINESS: 0
DEPRESSION: 0

## 2024-11-07 DIAGNOSIS — Z79.4 TYPE 2 DIABETES MELLITUS WITH HYPERGLYCEMIA, WITH LONG-TERM CURRENT USE OF INSULIN: Primary | ICD-10-CM

## 2024-11-07 DIAGNOSIS — E11.65 TYPE 2 DIABETES MELLITUS WITH HYPERGLYCEMIA, WITH LONG-TERM CURRENT USE OF INSULIN: Primary | ICD-10-CM

## 2024-11-07 RX ORDER — HYDROCHLOROTHIAZIDE 12.5 MG/1
1 CAPSULE ORAL SEE ADMIN INSTRUCTIONS
Qty: 6 EACH | Refills: 3 | Status: CANCELLED | OUTPATIENT
Start: 2024-11-07 | End: 2025-11-07

## 2024-11-12 ENCOUNTER — PATIENT MESSAGE (OUTPATIENT)
Dept: CARDIOLOGY | Facility: CLINIC | Age: 54
End: 2024-11-12
Payer: COMMERCIAL

## 2024-11-12 DIAGNOSIS — E11.65 TYPE 2 DIABETES MELLITUS WITH HYPERGLYCEMIA, WITH LONG-TERM CURRENT USE OF INSULIN: Primary | ICD-10-CM

## 2024-11-12 DIAGNOSIS — Z79.4 TYPE 2 DIABETES MELLITUS WITH HYPERGLYCEMIA, WITH LONG-TERM CURRENT USE OF INSULIN: Primary | ICD-10-CM

## 2024-11-12 RX ORDER — HYDROCHLOROTHIAZIDE 12.5 MG/1
1 CAPSULE ORAL
Qty: 2 EACH | Refills: 2 | Status: SHIPPED | OUTPATIENT
Start: 2024-11-12 | End: 2024-11-15 | Stop reason: SDUPTHER

## 2024-11-15 ENCOUNTER — TELEPHONE (OUTPATIENT)
Dept: CARDIOLOGY | Facility: CLINIC | Age: 54
End: 2024-11-15
Payer: COMMERCIAL

## 2024-11-15 DIAGNOSIS — E11.65 TYPE 2 DIABETES MELLITUS WITH HYPERGLYCEMIA, WITH LONG-TERM CURRENT USE OF INSULIN: Primary | ICD-10-CM

## 2024-11-15 DIAGNOSIS — Z79.4 TYPE 2 DIABETES MELLITUS WITH HYPERGLYCEMIA, WITH LONG-TERM CURRENT USE OF INSULIN: Primary | ICD-10-CM

## 2024-11-15 RX ORDER — HYDROCHLOROTHIAZIDE 12.5 MG/1
1 CAPSULE ORAL
Qty: 2 EACH | Refills: 2 | Status: SHIPPED | OUTPATIENT
Start: 2024-11-15 | End: 2025-01-14

## 2024-11-15 RX ORDER — HYDROCHLOROTHIAZIDE 12.5 MG/1
1 CAPSULE ORAL SEE ADMIN INSTRUCTIONS
Qty: 6 EACH | Refills: 3 | Status: CANCELLED | OUTPATIENT
Start: 2024-11-15 | End: 2025-11-15

## 2024-11-16 ENCOUNTER — HOSPITAL ENCOUNTER (OUTPATIENT)
Dept: RADIOLOGY | Facility: HOSPITAL | Age: 54
Discharge: HOME | End: 2024-11-16
Payer: COMMERCIAL

## 2024-11-16 VITALS — BODY MASS INDEX: 24.75 KG/M2 | WEIGHT: 145 LBS | HEIGHT: 64 IN

## 2024-11-16 DIAGNOSIS — Z12.31 ENCOUNTER FOR SCREENING MAMMOGRAM FOR MALIGNANT NEOPLASM OF BREAST: ICD-10-CM

## 2024-11-16 PROCEDURE — 77067 SCR MAMMO BI INCL CAD: CPT

## 2024-11-16 PROCEDURE — 77067 SCR MAMMO BI INCL CAD: CPT | Performed by: RADIOLOGY

## 2024-11-16 PROCEDURE — 77063 BREAST TOMOSYNTHESIS BI: CPT | Performed by: RADIOLOGY

## 2025-01-10 ENCOUNTER — TELEPHONE (OUTPATIENT)
Dept: PRIMARY CARE | Facility: CLINIC | Age: 55
End: 2025-01-10
Payer: COMMERCIAL

## 2025-01-10 DIAGNOSIS — Z79.4 TYPE 2 DIABETES MELLITUS WITH OTHER CIRCULATORY COMPLICATION, WITH LONG-TERM CURRENT USE OF INSULIN: ICD-10-CM

## 2025-01-10 DIAGNOSIS — E11.65 TYPE 2 DIABETES MELLITUS WITH HYPERGLYCEMIA, WITH LONG-TERM CURRENT USE OF INSULIN: ICD-10-CM

## 2025-01-10 DIAGNOSIS — Z79.4 TYPE 2 DIABETES MELLITUS WITH HYPERGLYCEMIA, WITH LONG-TERM CURRENT USE OF INSULIN: ICD-10-CM

## 2025-01-10 DIAGNOSIS — E03.9 HYPOTHYROIDISM, UNSPECIFIED TYPE: ICD-10-CM

## 2025-01-10 DIAGNOSIS — E11.59 TYPE 2 DIABETES MELLITUS WITH OTHER CIRCULATORY COMPLICATION, WITH LONG-TERM CURRENT USE OF INSULIN: ICD-10-CM

## 2025-01-10 RX ORDER — TIRZEPATIDE 7.5 MG/.5ML
7.5 INJECTION, SOLUTION SUBCUTANEOUS
Qty: 6.5 ML | Refills: 1 | Status: SHIPPED | OUTPATIENT
Start: 2025-01-12

## 2025-01-10 RX ORDER — LEVOTHYROXINE SODIUM 100 UG/1
100 TABLET ORAL DAILY
Qty: 90 TABLET | Refills: 3 | Status: SHIPPED | OUTPATIENT
Start: 2025-01-10 | End: 2026-01-10

## 2025-01-10 NOTE — TELEPHONE ENCOUNTER
tirzepatide (Mounjaro) 7.5 mg/0.5 mL pen injector//Inject 7.5 mg under the skin 1 (one) time per week.  levothyroxine (Synthroid, Levoxyl) 100 mcg tablet// Take 1 tablet (100 mcg) by mouth early in the morning.. Take on an empty stomach at the same time each day, either 30 to 60 minutes prior to breakfast  levothyroxine (Synthroid, Levoxyl) 200 mcg tablet//Take 1 tablet (200 mcg) by mouth once daily  insulin glargine (Lantus Solostar U-100 Insulin) 100 unit/mL (3 mL) pen//Inject 45 Units under the skin once daily at bedtime. Take as directed per insulin instructions.  [id number] - [place], [place]

## 2025-01-31 ENCOUNTER — APPOINTMENT (OUTPATIENT)
Dept: PRIMARY CARE | Facility: CLINIC | Age: 55
End: 2025-01-31
Payer: COMMERCIAL

## 2025-03-14 ENCOUNTER — APPOINTMENT (OUTPATIENT)
Dept: PRIMARY CARE | Facility: CLINIC | Age: 55
End: 2025-03-14
Payer: COMMERCIAL

## 2025-03-27 ENCOUNTER — TELEPHONE (OUTPATIENT)
Dept: SCHEDULING | Age: 55
End: 2025-03-27

## 2025-03-27 ENCOUNTER — TELEPHONE (OUTPATIENT)
Dept: CARDIOLOGY | Facility: CLINIC | Age: 55
End: 2025-03-27

## 2025-03-27 DIAGNOSIS — Z79.4 TYPE 2 DIABETES MELLITUS WITH HYPERGLYCEMIA, WITH LONG-TERM CURRENT USE OF INSULIN: ICD-10-CM

## 2025-03-27 DIAGNOSIS — E11.59 TYPE 2 DIABETES MELLITUS WITH OTHER CIRCULATORY COMPLICATION, WITH LONG-TERM CURRENT USE OF INSULIN: ICD-10-CM

## 2025-03-27 DIAGNOSIS — E11.65 TYPE 2 DIABETES MELLITUS WITH HYPERGLYCEMIA, WITH LONG-TERM CURRENT USE OF INSULIN: ICD-10-CM

## 2025-03-27 DIAGNOSIS — Z79.4 TYPE 2 DIABETES MELLITUS WITH OTHER CIRCULATORY COMPLICATION, WITH LONG-TERM CURRENT USE OF INSULIN: ICD-10-CM

## 2025-03-27 RX ORDER — TIRZEPATIDE 7.5 MG/.5ML
7.5 INJECTION, SOLUTION SUBCUTANEOUS
Qty: 6.5 ML | Refills: 3 | Status: SHIPPED | OUTPATIENT
Start: 2025-03-30

## 2025-03-27 RX ORDER — BLOOD-GLUCOSE SENSOR
1 EACH MISCELLANEOUS SEE ADMIN INSTRUCTIONS
Qty: 6 EACH | Refills: 3 | Status: SHIPPED | OUTPATIENT
Start: 2025-03-27 | End: 2026-03-27

## 2025-03-27 NOTE — TELEPHONE ENCOUNTER
Shay Tripp! This patient called the [facility] triage line today to inquire about her Roberto 3 sensor and Mounjaro prescriptions. I saw that you had already written her a Loop Survey message this morning updating her that you had submitted the prescriptions to Dr. Galvin and were just waiting for his signature and I advised her of this.  She also asked if you can send her a Loop Survey message with the name and number of the pharmacist who works with [facility] so she can have it for future reference. She said his first name is Talha but can't remember his last name or number.  Thank you!  -Ritu

## 2025-03-28 ENCOUNTER — APPOINTMENT (OUTPATIENT)
Dept: PRIMARY CARE | Facility: CLINIC | Age: 55
End: 2025-03-28
Payer: COMMERCIAL

## 2025-04-11 ENCOUNTER — APPOINTMENT (OUTPATIENT)
Dept: PRIMARY CARE | Facility: CLINIC | Age: 55
End: 2025-04-11
Payer: COMMERCIAL

## 2025-04-18 ENCOUNTER — TELEPHONE (OUTPATIENT)
Dept: CARDIOLOGY | Facility: CLINIC | Age: 55
End: 2025-04-18
Payer: COMMERCIAL

## 2025-04-18 ENCOUNTER — TELEPHONE (OUTPATIENT)
Dept: CARDIOLOGY | Facility: HOSPITAL | Age: 55
End: 2025-04-18
Payer: COMMERCIAL

## 2025-04-18 DIAGNOSIS — Z79.4 TYPE 2 DIABETES MELLITUS WITH OTHER CIRCULATORY COMPLICATION, WITH LONG-TERM CURRENT USE OF INSULIN: Primary | ICD-10-CM

## 2025-04-18 DIAGNOSIS — Z79.4 TYPE 2 DIABETES MELLITUS WITH HYPERGLYCEMIA, WITH LONG-TERM CURRENT USE OF INSULIN: ICD-10-CM

## 2025-04-18 DIAGNOSIS — E11.59 TYPE 2 DIABETES MELLITUS WITH OTHER CIRCULATORY COMPLICATION, WITH LONG-TERM CURRENT USE OF INSULIN: Primary | ICD-10-CM

## 2025-04-18 DIAGNOSIS — E11.65 TYPE 2 DIABETES MELLITUS WITH HYPERGLYCEMIA, WITH LONG-TERM CURRENT USE OF INSULIN: ICD-10-CM

## 2025-04-18 DIAGNOSIS — E11.59 TYPE 2 DIABETES MELLITUS WITH OTHER CIRCULATORY COMPLICATION, WITH LONG-TERM CURRENT USE OF INSULIN: ICD-10-CM

## 2025-04-18 DIAGNOSIS — Z79.4 TYPE 2 DIABETES MELLITUS WITH OTHER CIRCULATORY COMPLICATION, WITH LONG-TERM CURRENT USE OF INSULIN: ICD-10-CM

## 2025-04-18 RX ORDER — BLOOD-GLUCOSE SENSOR
1 EACH MISCELLANEOUS SEE ADMIN INSTRUCTIONS
Qty: 6 EACH | Refills: 3 | Status: SHIPPED | OUTPATIENT
Start: 2025-04-18 | End: 2026-04-18

## 2025-04-18 RX ORDER — TIRZEPATIDE 7.5 MG/.5ML
7.5 INJECTION, SOLUTION SUBCUTANEOUS
Qty: 6.5 ML | Refills: 3 | Status: SHIPPED | OUTPATIENT
Start: 2025-04-20

## 2025-04-18 NOTE — TELEPHONE ENCOUNTER
Patient called today stating that she still did not receive her Roberto 3 nor her Mounjaro.  Order was sent to Dr. Galvin to sign off on on March 27, 2025.  My chart message was sent to Patient on 3/27/25.  Patient did not read the message and stated that she had called the office about the medications which was not received.  Dr. Galvin stated he put the order in today.  I received a PA today which was sent on 4/17/25.  I left a message for the Miss goodman stating all of this.  Will review with pharmacy.

## 2025-04-23 ENCOUNTER — PATIENT MESSAGE (OUTPATIENT)
Dept: PHARMACY | Facility: HOSPITAL | Age: 55
End: 2025-04-23
Payer: COMMERCIAL

## 2025-04-23 DIAGNOSIS — E11.65 TYPE 2 DIABETES MELLITUS WITH HYPERGLYCEMIA, WITH LONG-TERM CURRENT USE OF INSULIN: Primary | ICD-10-CM

## 2025-04-23 DIAGNOSIS — Z79.4 TYPE 2 DIABETES MELLITUS WITH HYPERGLYCEMIA, WITH LONG-TERM CURRENT USE OF INSULIN: Primary | ICD-10-CM

## 2025-04-25 ENCOUNTER — APPOINTMENT (OUTPATIENT)
Dept: PRIMARY CARE | Facility: CLINIC | Age: 55
End: 2025-04-25
Payer: COMMERCIAL

## 2025-04-25 VITALS — SYSTOLIC BLOOD PRESSURE: 120 MMHG | HEART RATE: 66 BPM | DIASTOLIC BLOOD PRESSURE: 66 MMHG

## 2025-04-25 DIAGNOSIS — Z79.4 TYPE 2 DIABETES MELLITUS WITH HYPERGLYCEMIA, WITH LONG-TERM CURRENT USE OF INSULIN: ICD-10-CM

## 2025-04-25 DIAGNOSIS — E11.65 TYPE 2 DIABETES MELLITUS WITH HYPERGLYCEMIA, WITH LONG-TERM CURRENT USE OF INSULIN: ICD-10-CM

## 2025-04-25 DIAGNOSIS — E03.9 HYPOTHYROIDISM, UNSPECIFIED TYPE: ICD-10-CM

## 2025-04-25 DIAGNOSIS — E78.2 MIXED HYPERLIPIDEMIA: Primary | ICD-10-CM

## 2025-04-25 DIAGNOSIS — E11.59 TYPE 2 DIABETES MELLITUS WITH OTHER CIRCULATORY COMPLICATION, WITHOUT LONG-TERM CURRENT USE OF INSULIN: ICD-10-CM

## 2025-04-25 PROBLEM — E78.1 HYPERTRIGLYCERIDEMIA: Status: RESOLVED | Noted: 2024-01-19 | Resolved: 2025-04-25

## 2025-04-25 PROCEDURE — 3078F DIAST BP <80 MM HG: CPT | Performed by: FAMILY MEDICINE

## 2025-04-25 PROCEDURE — 99214 OFFICE O/P EST MOD 30 MIN: CPT | Performed by: FAMILY MEDICINE

## 2025-04-25 PROCEDURE — 3074F SYST BP LT 130 MM HG: CPT | Performed by: FAMILY MEDICINE

## 2025-04-25 PROCEDURE — 1036F TOBACCO NON-USER: CPT | Performed by: FAMILY MEDICINE

## 2025-04-25 RX ORDER — TIRZEPATIDE 7.5 MG/.5ML
7.5 INJECTION, SOLUTION SUBCUTANEOUS
Qty: 6.5 ML | Refills: 3 | Status: SHIPPED | OUTPATIENT
Start: 2025-04-27

## 2025-04-25 RX ORDER — INSULIN GLARGINE 100 [IU]/ML
60 INJECTION, SOLUTION SUBCUTANEOUS NIGHTLY
Qty: 54 ML | Refills: 3 | Status: SHIPPED | OUTPATIENT
Start: 2025-04-25 | End: 2026-04-25

## 2025-04-25 RX ORDER — PEN NEEDLE, DIABETIC 30 GX3/16"
NEEDLE, DISPOSABLE MISCELLANEOUS
Qty: 400 EACH | Refills: 3 | Status: SHIPPED | OUTPATIENT
Start: 2025-04-25

## 2025-04-25 NOTE — PROGRESS NOTES
Subjective   Patient ID: Cleopatra Duncan is a 54 y.o. female who presents for fu    HPI   Patient has been compliant with taking all  current medications. FBG has been at 200's most days.  + significant weight gain. No lower extremities skin lesion.  No GI upset  or muscle ache while on zetia and  statin for high lipids. Normal appetite. No CP, HA, dizziness, heart palpitation. No claudication or falls. Good mood.   pt cont  having fatigue on current dose of  Levoxyl. No dry skin or constipation.   Review of Systems    Objective   /66   Pulse 66     Physical Exam  NAD, well groomed, No sclera icterus.  lungs: CTA b/l, heart: RRR, abd: soft, no tenderness, BS+, no dry skin, Good balance. CNII-XII were grossly intact, good judgment and memory. No depressed mood.    Assessment/Plan   Assessment & Plan  Mixed hyperlipidemia  Hyperlipidemia on zetia and  statin. No GI upset or muscle ache. Will monitor labs for evaluation.  Health diet and regular exercise. Decrease calorie intake to lose wt.  f/u in 3 mos.     Orders:    Lipid Panel; Future    TSH with reflex to Free T4 if abnormal; Future    Comprehensive Metabolic Panel; Future    Hemoglobin A1C; Future    Albumin-Creatinine Ratio, Urine Random; Future    Type 2 diabetes mellitus with hyperglycemia, with long-term current use of insulin  No controlled. Increase dose of insulin and jardiance. Check labs. advise eye exam by an OD yearly and checking bilateral feet for skin lesions qhs. Healthy diet and regular exercise. Decrease calorie intake to lose wt.      Orders:    empagliflozin (Jardiance) 25 mg tablet; Take 1 tablet (25 mg) by mouth once daily.    insulin glargine (Lantus Solostar U-100 Insulin) 100 unit/mL (3 mL) pen; Inject 60 Units under the skin once daily at bedtime. Take as directed per insulin instructions.    tirzepatide (Mounjaro) 7.5 mg/0.5 mL pen injector; Inject 7.5 mg under the skin 1 (one) time per week.    Lipid Panel; Future    TSH with  "reflex to Free T4 if abnormal; Future    Comprehensive Metabolic Panel; Future    Hemoglobin A1C; Future    Albumin-Creatinine Ratio, Urine Random; Future    Hypothyroidism, unspecified type  Hypothyroidism,  Check TSH for evaluation. Will adjust dose of levothyroxine depending on the TSH level.    Orders:    Lipid Panel; Future    TSH with reflex to Free T4 if abnormal; Future    Comprehensive Metabolic Panel; Future    Hemoglobin A1C; Future    Albumin-Creatinine Ratio, Urine Random; Future    Type 2 diabetes mellitus with other circulatory complication, without long-term current use of insulin    Orders:    insulin glargine (Lantus Solostar U-100 Insulin) 100 unit/mL (3 mL) pen; Inject 60 Units under the skin once daily at bedtime. Take as directed per insulin instructions.    pen needle, diabetic 32 gauge x 5/32\" needle; Use qid           "

## 2025-04-25 NOTE — ASSESSMENT & PLAN NOTE
Hyperlipidemia on zetia and  statin. No GI upset or muscle ache. Will monitor labs for evaluation.  Health diet and regular exercise. Decrease calorie intake to lose wt.  f/u in 3 mos.     Orders:    Lipid Panel; Future    TSH with reflex to Free T4 if abnormal; Future    Comprehensive Metabolic Panel; Future    Hemoglobin A1C; Future    Albumin-Creatinine Ratio, Urine Random; Future

## 2025-04-25 NOTE — ASSESSMENT & PLAN NOTE
No controlled. Increase dose of insulin and jardiance. Check labs. advise eye exam by an OD yearly and checking bilateral feet for skin lesions qhs. Healthy diet and regular exercise. Decrease calorie intake to lose wt.      Orders:    empagliflozin (Jardiance) 25 mg tablet; Take 1 tablet (25 mg) by mouth once daily.    insulin glargine (Lantus Solostar U-100 Insulin) 100 unit/mL (3 mL) pen; Inject 60 Units under the skin once daily at bedtime. Take as directed per insulin instructions.    tirzepatide (Mounjaro) 7.5 mg/0.5 mL pen injector; Inject 7.5 mg under the skin 1 (one) time per week.    Lipid Panel; Future    TSH with reflex to Free T4 if abnormal; Future    Comprehensive Metabolic Panel; Future    Hemoglobin A1C; Future    Albumin-Creatinine Ratio, Urine Random; Future

## 2025-04-25 NOTE — ASSESSMENT & PLAN NOTE
Hypothyroidism,  Check TSH for evaluation. Will adjust dose of levothyroxine depending on the TSH level.    Orders:    Lipid Panel; Future    TSH with reflex to Free T4 if abnormal; Future    Comprehensive Metabolic Panel; Future    Hemoglobin A1C; Future    Albumin-Creatinine Ratio, Urine Random; Future

## 2025-04-27 LAB
ALBUMIN SERPL-MCNC: 4.2 G/DL (ref 3.6–5.1)
ALBUMIN/CREAT UR: 6 MG/G CREAT
ALP SERPL-CCNC: 135 U/L (ref 37–153)
ALT SERPL-CCNC: 21 U/L (ref 6–29)
ANION GAP SERPL CALCULATED.4IONS-SCNC: 8 MMOL/L (CALC) (ref 7–17)
AST SERPL-CCNC: 18 U/L (ref 10–35)
BILIRUB SERPL-MCNC: 0.4 MG/DL (ref 0.2–1.2)
BUN SERPL-MCNC: 19 MG/DL (ref 7–25)
CALCIUM SERPL-MCNC: 9.2 MG/DL (ref 8.6–10.4)
CHLORIDE SERPL-SCNC: 105 MMOL/L (ref 98–110)
CHOLEST SERPL-MCNC: 167 MG/DL
CHOLEST/HDLC SERPL: 3 (CALC)
CO2 SERPL-SCNC: 24 MMOL/L (ref 20–32)
CREAT SERPL-MCNC: 0.6 MG/DL (ref 0.5–1.03)
CREAT UR-MCNC: 112 MG/DL (ref 20–275)
EGFRCR SERPLBLD CKD-EPI 2021: 106 ML/MIN/1.73M2
EST. AVERAGE GLUCOSE BLD GHB EST-MCNC: 212 MG/DL
EST. AVERAGE GLUCOSE BLD GHB EST-SCNC: 11.7 MMOL/L
GLUCOSE SERPL-MCNC: 217 MG/DL (ref 65–99)
HBA1C MFR BLD: 9 %
HDLC SERPL-MCNC: 55 MG/DL
LDLC SERPL CALC-MCNC: 92 MG/DL (CALC)
MICROALBUMIN UR-MCNC: 0.7 MG/DL
NONHDLC SERPL-MCNC: 112 MG/DL (CALC)
POTASSIUM SERPL-SCNC: 4.5 MMOL/L (ref 3.5–5.3)
PROT SERPL-MCNC: 6.8 G/DL (ref 6.1–8.1)
SODIUM SERPL-SCNC: 137 MMOL/L (ref 135–146)
T4 FREE SERPL-MCNC: 1.7 NG/DL (ref 0.8–1.8)
TRIGL SERPL-MCNC: 100 MG/DL
TSH SERPL-ACNC: 0.24 MIU/L

## 2025-05-06 RX ORDER — TIRZEPATIDE 5 MG/.5ML
5 INJECTION, SOLUTION SUBCUTANEOUS WEEKLY
Qty: 2 ML | Refills: 0 | Status: SHIPPED | OUTPATIENT
Start: 2025-05-06 | End: 2025-06-03

## 2025-05-13 ENCOUNTER — APPOINTMENT (OUTPATIENT)
Dept: CARDIOLOGY | Facility: HOSPITAL | Age: 55
End: 2025-05-13
Payer: COMMERCIAL

## 2025-05-13 ENCOUNTER — HOSPITAL ENCOUNTER (EMERGENCY)
Facility: HOSPITAL | Age: 55
Discharge: HOME | End: 2025-05-14
Attending: STUDENT IN AN ORGANIZED HEALTH CARE EDUCATION/TRAINING PROGRAM
Payer: COMMERCIAL

## 2025-05-13 DIAGNOSIS — R21 RASH: Primary | ICD-10-CM

## 2025-05-13 LAB
ALBUMIN SERPL BCP-MCNC: 3.8 G/DL (ref 3.4–5)
ALP SERPL-CCNC: 93 U/L (ref 33–110)
ALT SERPL W P-5'-P-CCNC: 15 U/L (ref 7–45)
ANION GAP SERPL CALC-SCNC: 11 MMOL/L (ref 10–20)
AST SERPL W P-5'-P-CCNC: 16 U/L (ref 9–39)
BASOPHILS # BLD AUTO: 0.05 X10*3/UL (ref 0–0.1)
BASOPHILS NFR BLD AUTO: 0.9 %
BILIRUB SERPL-MCNC: 0.5 MG/DL (ref 0–1.2)
BNP SERPL-MCNC: 5 PG/ML (ref 0–99)
BUN SERPL-MCNC: 29 MG/DL (ref 6–23)
CALCIUM SERPL-MCNC: 9.2 MG/DL (ref 8.6–10.3)
CARDIAC TROPONIN I PNL SERPL HS: 3 NG/L (ref 0–13)
CHLORIDE SERPL-SCNC: 108 MMOL/L (ref 98–107)
CO2 SERPL-SCNC: 24 MMOL/L (ref 21–32)
CREAT SERPL-MCNC: 0.83 MG/DL (ref 0.5–1.05)
EGFRCR SERPLBLD CKD-EPI 2021: 83 ML/MIN/1.73M*2
EOSINOPHIL # BLD AUTO: 0.28 X10*3/UL (ref 0–0.7)
EOSINOPHIL NFR BLD AUTO: 5.1 %
ERYTHROCYTE [DISTWIDTH] IN BLOOD BY AUTOMATED COUNT: 12.7 % (ref 11.5–14.5)
GLUCOSE BLD MANUAL STRIP-MCNC: 116 MG/DL (ref 74–99)
GLUCOSE SERPL-MCNC: 120 MG/DL (ref 74–99)
HCT VFR BLD AUTO: 39.4 % (ref 36–46)
HGB BLD-MCNC: 13.2 G/DL (ref 12–16)
IMM GRANULOCYTES # BLD AUTO: 0.03 X10*3/UL (ref 0–0.7)
IMM GRANULOCYTES NFR BLD AUTO: 0.5 % (ref 0–0.9)
LYMPHOCYTES # BLD AUTO: 1.57 X10*3/UL (ref 1.2–4.8)
LYMPHOCYTES NFR BLD AUTO: 28.8 %
MCH RBC QN AUTO: 27.5 PG (ref 26–34)
MCHC RBC AUTO-ENTMCNC: 33.5 G/DL (ref 32–36)
MCV RBC AUTO: 82 FL (ref 80–100)
MONOCYTES # BLD AUTO: 0.38 X10*3/UL (ref 0.1–1)
MONOCYTES NFR BLD AUTO: 7 %
NEUTROPHILS # BLD AUTO: 3.15 X10*3/UL (ref 1.2–7.7)
NEUTROPHILS NFR BLD AUTO: 57.7 %
NRBC BLD-RTO: 0 /100 WBCS (ref 0–0)
PLATELET # BLD AUTO: 266 X10*3/UL (ref 150–450)
POTASSIUM SERPL-SCNC: 3.4 MMOL/L (ref 3.5–5.3)
PROT SERPL-MCNC: 7 G/DL (ref 6.4–8.2)
RBC # BLD AUTO: 4.8 X10*6/UL (ref 4–5.2)
SODIUM SERPL-SCNC: 140 MMOL/L (ref 136–145)
WBC # BLD AUTO: 5.5 X10*3/UL (ref 4.4–11.3)

## 2025-05-13 PROCEDURE — 82947 ASSAY GLUCOSE BLOOD QUANT: CPT

## 2025-05-13 PROCEDURE — 96360 HYDRATION IV INFUSION INIT: CPT

## 2025-05-13 PROCEDURE — 83880 ASSAY OF NATRIURETIC PEPTIDE: CPT | Performed by: STUDENT IN AN ORGANIZED HEALTH CARE EDUCATION/TRAINING PROGRAM

## 2025-05-13 PROCEDURE — 99284 EMERGENCY DEPT VISIT MOD MDM: CPT | Mod: 25

## 2025-05-13 PROCEDURE — 36415 COLL VENOUS BLD VENIPUNCTURE: CPT | Performed by: STUDENT IN AN ORGANIZED HEALTH CARE EDUCATION/TRAINING PROGRAM

## 2025-05-13 PROCEDURE — 93005 ELECTROCARDIOGRAM TRACING: CPT

## 2025-05-13 PROCEDURE — 80053 COMPREHEN METABOLIC PANEL: CPT | Performed by: STUDENT IN AN ORGANIZED HEALTH CARE EDUCATION/TRAINING PROGRAM

## 2025-05-13 PROCEDURE — 84484 ASSAY OF TROPONIN QUANT: CPT | Performed by: STUDENT IN AN ORGANIZED HEALTH CARE EDUCATION/TRAINING PROGRAM

## 2025-05-13 PROCEDURE — 2500000004 HC RX 250 GENERAL PHARMACY W/ HCPCS (ALT 636 FOR OP/ED): Mod: JZ | Performed by: STUDENT IN AN ORGANIZED HEALTH CARE EDUCATION/TRAINING PROGRAM

## 2025-05-13 PROCEDURE — 85025 COMPLETE CBC W/AUTO DIFF WBC: CPT | Performed by: STUDENT IN AN ORGANIZED HEALTH CARE EDUCATION/TRAINING PROGRAM

## 2025-05-13 RX ADMIN — SODIUM CHLORIDE, SODIUM LACTATE, POTASSIUM CHLORIDE, AND CALCIUM CHLORIDE 1000 ML: .6; .31; .03; .02 INJECTION, SOLUTION INTRAVENOUS at 23:00

## 2025-05-13 ASSESSMENT — COLUMBIA-SUICIDE SEVERITY RATING SCALE - C-SSRS
2. HAVE YOU ACTUALLY HAD ANY THOUGHTS OF KILLING YOURSELF?: NO
1. IN THE PAST MONTH, HAVE YOU WISHED YOU WERE DEAD OR WISHED YOU COULD GO TO SLEEP AND NOT WAKE UP?: NO
6. HAVE YOU EVER DONE ANYTHING, STARTED TO DO ANYTHING, OR PREPARED TO DO ANYTHING TO END YOUR LIFE?: NO

## 2025-05-13 ASSESSMENT — PAIN SCALES - GENERAL: PAINLEVEL_OUTOF10: 1

## 2025-05-13 ASSESSMENT — PAIN - FUNCTIONAL ASSESSMENT: PAIN_FUNCTIONAL_ASSESSMENT: 0-10

## 2025-05-14 VITALS
OXYGEN SATURATION: 97 % | WEIGHT: 145 LBS | RESPIRATION RATE: 16 BRPM | DIASTOLIC BLOOD PRESSURE: 66 MMHG | BODY MASS INDEX: 24.75 KG/M2 | HEIGHT: 64 IN | HEART RATE: 85 BPM | TEMPERATURE: 97.9 F | SYSTOLIC BLOOD PRESSURE: 106 MMHG

## 2025-05-14 PROCEDURE — 2500000002 HC RX 250 W HCPCS SELF ADMINISTERED DRUGS (ALT 637 FOR MEDICARE OP, ALT 636 FOR OP/ED)

## 2025-05-14 RX ORDER — POTASSIUM CHLORIDE 750 MG/1
20 TABLET, FILM COATED, EXTENDED RELEASE ORAL ONCE
Status: COMPLETED | OUTPATIENT
Start: 2025-05-14 | End: 2025-05-14

## 2025-05-14 RX ORDER — POTASSIUM CHLORIDE 750 MG/1
TABLET, FILM COATED, EXTENDED RELEASE ORAL
Status: COMPLETED
Start: 2025-05-14 | End: 2025-05-14

## 2025-05-14 RX ORDER — POTASSIUM CHLORIDE 750 MG/1
20 TABLET, FILM COATED, EXTENDED RELEASE ORAL DAILY
Status: DISCONTINUED | OUTPATIENT
Start: 2025-05-14 | End: 2025-05-14

## 2025-05-14 RX ADMIN — POTASSIUM CHLORIDE 20 MEQ: 750 TABLET, FILM COATED, EXTENDED RELEASE ORAL at 00:18

## 2025-05-14 NOTE — ED PROVIDER NOTES
"HPI   Chief Complaint   Patient presents with    Rash    Circulatory Problem     \"Feet are turning purple\"    Hypoglycemia    Leg Swelling       This is a 54-year-old male past medical history of diabetes who presents emergency department for multiple complaints.  She states that she was using Mounjaro for her insurance stopped routinely on Thursday.  Since Thursday she has been having rashes on her arms and legs intermittently.  She states that she saw her legs her rash is resolved with Benadryl.  She reports her legs are feeling swollen and she has a history of lower than normal and she feels that her feet are discolored.                          John Coma Scale Score: 15                  Patient History   Medical History[1]  Surgical History[2]  Family History[3]  Social History[4]    Physical Exam   ED Triage Vitals [05/13/25 2207]   Temperature Heart Rate Respirations BP   36.6 °C (97.9 °F) (!) 116 16 126/83      Pulse Ox Temp Source Heart Rate Source Patient Position   99 % Temporal Monitor --      BP Location FiO2 (%)     -- --       Physical Exam  Constitutional:       General: She is not in acute distress.  HENT:      Head: Normocephalic and atraumatic.      Right Ear: Tympanic membrane normal.      Left Ear: Tympanic membrane normal.      Mouth/Throat:      Mouth: Mucous membranes are moist.   Eyes:      Extraocular Movements: Extraocular movements intact.      Conjunctiva/sclera: Conjunctivae normal.      Pupils: Pupils are equal, round, and reactive to light.   Cardiovascular:      Rate and Rhythm: Normal rate and regular rhythm.      Heart sounds: No murmur heard.  Pulmonary:      Effort: Pulmonary effort is normal. No respiratory distress.      Breath sounds: Normal breath sounds. No stridor. No wheezing or rales.   Abdominal:      General: Bowel sounds are normal. There is no distension.      Tenderness: There is no abdominal tenderness. There is no guarding or rebound.   Musculoskeletal:         " General: No swelling, tenderness or deformity. Normal range of motion.   Skin:     General: Skin is warm and dry.      Coloration: Skin is not jaundiced.      Findings: Rash present. No bruising or lesion.      Comments: Macular papular rash on her legs, 2+ pulses bilateral DP and PT pulses.   Neurological:      General: No focal deficit present.      Mental Status: She is alert and oriented to person, place, and time. Mental status is at baseline.      Cranial Nerves: No cranial nerve deficit.      Motor: No weakness.   Psychiatric:         Mood and Affect: Mood normal.       Labs Reviewed   POCT GLUCOSE - Abnormal       Result Value    POCT Glucose 116 (*)      No orders to display       ED Course & MDM   ED Course as of 05/14/25 0007   Tue May 13, 2025   0890 EKG on my read shows sinus rhythm at a rate of 85 bpm no ST changes or elevations, normal intervals. [CF]      ED Course User Index  [CF] Shikha Saba MD       Medical Decision Making  This is a 54-year-old female presents emerged from for a rash with concern for circulatory problem.  Her legs have 2+ DP and PT pulses she does have a rash on her lower legs but the rash on her arms and abdomen are now gone.  She is not having chest pain her EKG showed no signs of ischemia her troponin is negative.  Potassium slightly low but no other slight derangements on repeat exam she is no longer having any symptoms.  She has no signs of hypoglycemia and she has no signs of DKA.  At this time patient is safe for discharge home.        Procedure  Procedures       [1]   Past Medical History:  Diagnosis Date    Cholecystitis 06/09/2023    Cyst of breast 07/07/2023    Rectal bleeding 06/09/2023    Type 2 diabetes mellitus with circulatory disorder, with long-term current use of insulin 12/28/2023    Type 2 diabetes mellitus with hyperglycemia, with long-term current use of insulin 08/09/2011    Vertigo 07/07/2023   [2]   Past Surgical History:  Procedure Laterality  Date    OTHER SURGICAL HISTORY  07/16/2019    Hysterectomy    OTHER SURGICAL HISTORY  07/16/2019    [place] tooth extraction   [3]   Family History  Problem Relation Name Age of Onset    Hypertension Father      Prostate cancer Father      Other (bladder cancer) Maternal Grandmother      Breast cancer Maternal Grandmother      Breast cancer Mother's Sister      Lung cancer Mother's Sister     [4]   Social History  Tobacco Use    Smoking status: Never     Passive exposure: Never    Smokeless tobacco: Never   Vaping Use    Vaping status: Never Used   Substance Use Topics    Alcohol use: Yes     Alcohol/week: 1.0 standard drink of alcohol     Types: 1 Cans of beer per week     Comment: monthly    Drug use: Never        Shikha Saba MD  05/14/25 0007

## 2025-05-14 NOTE — ED TRIAGE NOTES
Pt presents to the ED for multiple medical complaints. Pt states 6 weeks ago she was denied her medication mounjaro by insurance. Pt started retaking the medication last Thursday. Pt states since Thursday, she has had multiple incidents of rashes on her arms and chest that resolves with benadryl. Rash came back today, all over legs today with lower leg edema. Pt states she feels her sugar has also been lower than normal, 'in the 90s which never happens'. Pt states that her circulation is also poor and her kids stated 'Your feet look purple' today as well.

## 2025-05-15 LAB
ATRIAL RATE: 87 BPM
P AXIS: 56 DEGREES
PR INTERVAL: 167 MS
Q ONSET: 252 MS
QRS COUNT: 14 BEATS
QRS DURATION: 99 MS
QT INTERVAL: 386 MS
QTC CALCULATION(BAZETT): 459 MS
QTC FREDERICIA: 433 MS
R AXIS: -56 DEGREES
T AXIS: 32 DEGREES
T OFFSET: 445 MS
VENTRICULAR RATE: 85 BPM

## 2025-06-25 ENCOUNTER — TELEPHONE (OUTPATIENT)
Dept: PHARMACY | Facility: HOSPITAL | Age: 55
End: 2025-06-25
Payer: COMMERCIAL

## 2025-06-25 NOTE — TELEPHONE ENCOUNTER
Patient left a voicemail stating that she was unable to pickup her prescription for Mounjaro 7.5 mg once weekly. Prior authorization was approved through 5/5/2026. Communication sent to  prior authorization support team to contact regarding the current concern with filling therapy.

## 2025-06-26 ENCOUNTER — TELEPHONE (OUTPATIENT)
Dept: PHARMACY | Facility: HOSPITAL | Age: 55
End: 2025-06-26
Payer: COMMERCIAL

## 2025-06-26 NOTE — TELEPHONE ENCOUNTER
Cleopatra THEODORE Duncan has been approved for prior authorization for Mounjaro 7.5 mg . Patient has been contacted regarding the status of their prior authorization.     Date of expiration: 12/26/2025      Please contact clinical pharmacy with any further questions. Thank you.    Gino Farmer, PharmD  Clinical Pharmacist

## 2025-07-23 ENCOUNTER — TELEPHONE (OUTPATIENT)
Dept: PRIMARY CARE | Facility: CLINIC | Age: 55
End: 2025-07-23
Payer: COMMERCIAL

## 2025-07-23 NOTE — TELEPHONE ENCOUNTER
rosuvastatin (Crestor) 40 mg tablet// Take 1 tablet (40 mg) by mouth once daily.       [id number] - [place], [place] -

## 2025-07-25 ENCOUNTER — APPOINTMENT (OUTPATIENT)
Dept: PRIMARY CARE | Facility: CLINIC | Age: 55
End: 2025-07-25
Payer: COMMERCIAL

## 2025-07-25 VITALS — HEART RATE: 70 BPM | SYSTOLIC BLOOD PRESSURE: 121 MMHG | DIASTOLIC BLOOD PRESSURE: 66 MMHG

## 2025-07-25 DIAGNOSIS — E03.9 HYPOTHYROIDISM, UNSPECIFIED TYPE: Primary | ICD-10-CM

## 2025-07-25 DIAGNOSIS — Z79.4 TYPE 2 DIABETES MELLITUS WITH HYPERGLYCEMIA, WITH LONG-TERM CURRENT USE OF INSULIN: ICD-10-CM

## 2025-07-25 DIAGNOSIS — E11.65 TYPE 2 DIABETES MELLITUS WITH HYPERGLYCEMIA, WITH LONG-TERM CURRENT USE OF INSULIN: ICD-10-CM

## 2025-07-25 DIAGNOSIS — E78.2 MIXED HYPERLIPIDEMIA: ICD-10-CM

## 2025-07-25 PROCEDURE — 3074F SYST BP LT 130 MM HG: CPT | Performed by: FAMILY MEDICINE

## 2025-07-25 PROCEDURE — 3078F DIAST BP <80 MM HG: CPT | Performed by: FAMILY MEDICINE

## 2025-07-25 PROCEDURE — 1036F TOBACCO NON-USER: CPT | Performed by: FAMILY MEDICINE

## 2025-07-25 PROCEDURE — 99214 OFFICE O/P EST MOD 30 MIN: CPT | Performed by: FAMILY MEDICINE

## 2025-07-25 RX ORDER — ROSUVASTATIN CALCIUM 40 MG/1
40 TABLET, COATED ORAL DAILY
Qty: 90 TABLET | Refills: 3 | Status: SHIPPED | OUTPATIENT
Start: 2025-07-25 | End: 2026-07-25

## 2025-07-25 ASSESSMENT — PATIENT HEALTH QUESTIONNAIRE - PHQ9
SUM OF ALL RESPONSES TO PHQ9 QUESTIONS 1 AND 2: 0
1. LITTLE INTEREST OR PLEASURE IN DOING THINGS: NOT AT ALL
2. FEELING DOWN, DEPRESSED OR HOPELESS: NOT AT ALL

## 2025-07-25 NOTE — ASSESSMENT & PLAN NOTE
Hyperlipidemia on statin. No GI upset or muscle ache. Will monitor labs for evaluation.  Health diet and regular exercise. Decrease calorie intake to lose wt.  f/u in 3 mos.     Orders:    rosuvastatin (Crestor) 40 mg tablet; Take 1 tablet (40 mg) by mouth once daily.

## 2025-07-25 NOTE — PROGRESS NOTES
Subjective   Patient ID: Cleopatra Duncan is a 54 y.o. female who presents for No chief complaint on file..    HPI   Patient has been compliant with taking all  current medications. FBG has been at 140's most days. No hypoglycemic episodes. No polydipsia, polyuria. No lower extremities skin lesion. No LE numbness or tingling. No blurry vision. No GI upset  or muscle ache while on statin for high lipids. Normal appetite. No CP, HA, dizziness, heart palpitation. No claudication or cold LE.  No LE edema. No falls. pt denies having fatigue, cold or heat intolerance while on current dose of  Levoxyl. + hair loss and  dry skin with occ constipation.  Pt sleeps well. No depressed mood.    Review of Systems    Objective   /66   Pulse 70     Physical Exam  NAD, well groomed, eyes: , No sclera icterus, no thyroid  enlargement.  lungs: CTA b/l, heart: RRR, no LE edema, abd: soft, no tenderness, BS+, normal strength and sensation  at bilateral lower extremities.  No dry skin but mild  dry hair. No tremor. Good balance. CNII-XII were grossly intact, No depressed mood    Assessment/Plan   Assessment & Plan  Type 2 diabetes mellitus with hyperglycemia, with long-term current use of insulin  DMII, controlled. Continue current medications. Will monitor A1C, urine albumin. advise eye exam by an OD yearly and checking bilateral feet for skin lesions qhs. Healthy diet and regular exercise.   Orders:    rosuvastatin (Crestor) 40 mg tablet; Take 1 tablet (40 mg) by mouth once daily.    Hemoglobin A1C; Future    Albumin-Creatinine Ratio, Urine Random; Future    Mixed hyperlipidemia  Hyperlipidemia on statin. No GI upset or muscle ache. Will monitor labs for evaluation.  Health diet and regular exercise. Decrease calorie intake to lose wt.  f/u in 3 mos.     Orders:    rosuvastatin (Crestor) 40 mg tablet; Take 1 tablet (40 mg) by mouth once daily.    Hypothyroidism, unspecified type  Hypothyroidism, asymptomatic. Check TSH for  evaluation. Will adjust dose of levothyroxine depending on the TSH level.    Orders:    TSH with reflex to Free T4 if abnormal; Future

## 2025-07-25 NOTE — ASSESSMENT & PLAN NOTE
DMII, controlled. Continue current medications. Will monitor A1C, urine albumin. advise eye exam by an OD yearly and checking bilateral feet for skin lesions qhs. Healthy diet and regular exercise.   Orders:    rosuvastatin (Crestor) 40 mg tablet; Take 1 tablet (40 mg) by mouth once daily.    Hemoglobin A1C; Future    Albumin-Creatinine Ratio, Urine Random; Future

## 2025-10-17 ENCOUNTER — APPOINTMENT (OUTPATIENT)
Dept: PRIMARY CARE | Facility: CLINIC | Age: 55
End: 2025-10-17
Payer: COMMERCIAL